# Patient Record
Sex: MALE | Race: BLACK OR AFRICAN AMERICAN | Employment: OTHER | URBAN - METROPOLITAN AREA
[De-identification: names, ages, dates, MRNs, and addresses within clinical notes are randomized per-mention and may not be internally consistent; named-entity substitution may affect disease eponyms.]

---

## 2017-08-17 ENCOUNTER — HOSPITAL ENCOUNTER (OUTPATIENT)
Dept: SURGERY | Age: 62
Discharge: HOME | End: 2017-08-17
Payer: COMMERCIAL

## 2017-08-17 VITALS
TEMPERATURE: 98.8 F | RESPIRATION RATE: 16 BRPM | WEIGHT: 173 LBS | DIASTOLIC BLOOD PRESSURE: 86 MMHG | OXYGEN SATURATION: 98 % | HEIGHT: 70 IN | BODY MASS INDEX: 24.77 KG/M2 | SYSTOLIC BLOOD PRESSURE: 148 MMHG | HEART RATE: 66 BPM

## 2017-08-17 LAB
ANION GAP SERPL CALC-SCNC: 7 MMOL/L (ref 7–16)
BUN SERPL-MCNC: 14 MG/DL (ref 8–23)
CALCIUM SERPL-MCNC: 9.1 MG/DL (ref 8.3–10.4)
CHLORIDE SERPL-SCNC: 107 MMOL/L (ref 98–107)
CO2 SERPL-SCNC: 28 MMOL/L (ref 21–32)
CREAT SERPL-MCNC: 1.46 MG/DL (ref 0.8–1.5)
GLUCOSE SERPL-MCNC: 106 MG/DL (ref 65–100)
HGB BLD-MCNC: 11.7 G/DL (ref 13.6–17.2)
POTASSIUM SERPL-SCNC: 4 MMOL/L (ref 3.5–5.1)
SODIUM SERPL-SCNC: 142 MMOL/L (ref 136–145)

## 2017-08-17 PROCEDURE — 80048 BASIC METABOLIC PNL TOTAL CA: CPT | Performed by: SURGERY

## 2017-08-17 PROCEDURE — 85018 HEMOGLOBIN: CPT | Performed by: SURGERY

## 2017-08-29 ENCOUNTER — ANESTHESIA EVENT (OUTPATIENT)
Dept: SURGERY | Age: 62
End: 2017-08-29
Payer: COMMERCIAL

## 2017-08-30 ENCOUNTER — APPOINTMENT (OUTPATIENT)
Dept: INTERVENTIONAL RADIOLOGY/VASCULAR | Age: 62
End: 2017-08-30
Attending: SURGERY
Payer: COMMERCIAL

## 2017-08-30 ENCOUNTER — ANESTHESIA (OUTPATIENT)
Dept: SURGERY | Age: 62
End: 2017-08-30
Payer: COMMERCIAL

## 2017-08-30 ENCOUNTER — HOSPITAL ENCOUNTER (OUTPATIENT)
Age: 62
Setting detail: OUTPATIENT SURGERY
Discharge: HOME | End: 2017-08-30
Attending: SURGERY | Admitting: SURGERY
Payer: COMMERCIAL

## 2017-08-30 VITALS
DIASTOLIC BLOOD PRESSURE: 76 MMHG | WEIGHT: 173.5 LBS | SYSTOLIC BLOOD PRESSURE: 103 MMHG | TEMPERATURE: 97.5 F | HEART RATE: 61 BPM | RESPIRATION RATE: 16 BRPM | BODY MASS INDEX: 24.84 KG/M2 | OXYGEN SATURATION: 93 % | HEIGHT: 70 IN

## 2017-08-30 LAB — GLUCOSE BLD STRIP.AUTO-MCNC: 116 MG/DL (ref 65–100)

## 2017-08-30 PROCEDURE — 74011250636 HC RX REV CODE- 250/636: Performed by: SURGERY

## 2017-08-30 PROCEDURE — 77030008477 HC STYL SATN SLP COVD -A: Performed by: ANESTHESIOLOGY

## 2017-08-30 PROCEDURE — 77030019908 HC STETH ESOPH SIMS -A: Performed by: ANESTHESIOLOGY

## 2017-08-30 PROCEDURE — 77030008703 HC TU ET UNCUF COVD -A: Performed by: ANESTHESIOLOGY

## 2017-08-30 PROCEDURE — 74011250636 HC RX REV CODE- 250/636: Performed by: ANESTHESIOLOGY

## 2017-08-30 PROCEDURE — 74011000250 HC RX REV CODE- 250: Performed by: ANESTHESIOLOGY

## 2017-08-30 PROCEDURE — C1894 INTRO/SHEATH, NON-LASER: HCPCS

## 2017-08-30 PROCEDURE — 77030020782 HC GWN BAIR PAWS FLX 3M -B: Performed by: ANESTHESIOLOGY

## 2017-08-30 PROCEDURE — C1725 CATH, TRANSLUMIN NON-LASER: HCPCS

## 2017-08-30 PROCEDURE — C1769 GUIDE WIRE: HCPCS

## 2017-08-30 PROCEDURE — 74011250636 HC RX REV CODE- 250/636

## 2017-08-30 PROCEDURE — 76210000008 HC OR PH I REC 6 TO 6.5 HR: Performed by: SURGERY

## 2017-08-30 PROCEDURE — C1887 CATHETER, GUIDING: HCPCS

## 2017-08-30 PROCEDURE — 74011636320 HC RX REV CODE- 636/320: Performed by: SURGERY

## 2017-08-30 PROCEDURE — 82962 GLUCOSE BLOOD TEST: CPT

## 2017-08-30 PROCEDURE — 76060000034 HC ANESTHESIA 1.5 TO 2 HR: Performed by: SURGERY

## 2017-08-30 PROCEDURE — 74011000250 HC RX REV CODE- 250

## 2017-08-30 PROCEDURE — 76010000129 HC CV SURG 1.5 TO 2 HR: Performed by: SURGERY

## 2017-08-30 PROCEDURE — 74011250637 HC RX REV CODE- 250/637: Performed by: ANESTHESIOLOGY

## 2017-08-30 PROCEDURE — 77030021532 HC CATH ANGI DX IMPRS MRTM -B

## 2017-08-30 PROCEDURE — 76210000020 HC REC RM PH II FIRST 0.5 HR: Performed by: SURGERY

## 2017-08-30 PROCEDURE — 75710 ARTERY X-RAYS ARM/LEG: CPT

## 2017-08-30 RX ORDER — SODIUM CHLORIDE 0.9 % (FLUSH) 0.9 %
5-10 SYRINGE (ML) INJECTION AS NEEDED
Status: DISCONTINUED | OUTPATIENT
Start: 2017-08-30 | End: 2017-08-30 | Stop reason: HOSPADM

## 2017-08-30 RX ORDER — ACETAMINOPHEN 500 MG
1000 TABLET ORAL
Status: DISCONTINUED | OUTPATIENT
Start: 2017-08-30 | End: 2017-08-30 | Stop reason: HOSPADM

## 2017-08-30 RX ORDER — DIPHENHYDRAMINE HYDROCHLORIDE 50 MG/ML
12.5 INJECTION, SOLUTION INTRAMUSCULAR; INTRAVENOUS
Status: DISCONTINUED | OUTPATIENT
Start: 2017-08-30 | End: 2017-08-30 | Stop reason: HOSPADM

## 2017-08-30 RX ORDER — LIDOCAINE HYDROCHLORIDE 20 MG/ML
INJECTION, SOLUTION EPIDURAL; INFILTRATION; INTRACAUDAL; PERINEURAL AS NEEDED
Status: DISCONTINUED | OUTPATIENT
Start: 2017-08-30 | End: 2017-08-30 | Stop reason: HOSPADM

## 2017-08-30 RX ORDER — PROTAMINE SULFATE 10 MG/ML
INJECTION, SOLUTION INTRAVENOUS AS NEEDED
Status: DISCONTINUED | OUTPATIENT
Start: 2017-08-30 | End: 2017-08-30 | Stop reason: HOSPADM

## 2017-08-30 RX ORDER — SODIUM CHLORIDE, SODIUM LACTATE, POTASSIUM CHLORIDE, CALCIUM CHLORIDE 600; 310; 30; 20 MG/100ML; MG/100ML; MG/100ML; MG/100ML
150 INJECTION, SOLUTION INTRAVENOUS CONTINUOUS
Status: DISCONTINUED | OUTPATIENT
Start: 2017-08-30 | End: 2017-08-30 | Stop reason: HOSPADM

## 2017-08-30 RX ORDER — NEOSTIGMINE METHYLSULFATE 1 MG/ML
INJECTION INTRAVENOUS AS NEEDED
Status: DISCONTINUED | OUTPATIENT
Start: 2017-08-30 | End: 2017-08-30 | Stop reason: HOSPADM

## 2017-08-30 RX ORDER — LIDOCAINE HYDROCHLORIDE 10 MG/ML
0.1 INJECTION INFILTRATION; PERINEURAL AS NEEDED
Status: DISCONTINUED | OUTPATIENT
Start: 2017-08-30 | End: 2017-08-30 | Stop reason: HOSPADM

## 2017-08-30 RX ORDER — GLYCOPYRROLATE 0.2 MG/ML
0.2 INJECTION INTRAMUSCULAR; INTRAVENOUS ONCE
Status: COMPLETED | OUTPATIENT
Start: 2017-08-30 | End: 2017-08-30

## 2017-08-30 RX ORDER — MIDAZOLAM HYDROCHLORIDE 1 MG/ML
2 INJECTION, SOLUTION INTRAMUSCULAR; INTRAVENOUS
Status: DISCONTINUED | OUTPATIENT
Start: 2017-08-30 | End: 2017-08-30 | Stop reason: HOSPADM

## 2017-08-30 RX ORDER — HYDROCODONE BITARTRATE AND ACETAMINOPHEN 5; 325 MG/1; MG/1
1 TABLET ORAL AS NEEDED
Status: DISCONTINUED | OUTPATIENT
Start: 2017-08-30 | End: 2017-08-30 | Stop reason: HOSPADM

## 2017-08-30 RX ORDER — OXYCODONE AND ACETAMINOPHEN 5; 325 MG/1; MG/1
1 TABLET ORAL
Status: DISCONTINUED | OUTPATIENT
Start: 2017-08-30 | End: 2017-08-30 | Stop reason: HOSPADM

## 2017-08-30 RX ORDER — FAMOTIDINE 20 MG/1
20 TABLET, FILM COATED ORAL ONCE
Status: COMPLETED | OUTPATIENT
Start: 2017-08-30 | End: 2017-08-30

## 2017-08-30 RX ORDER — HEPARIN SODIUM 5000 [USP'U]/ML
INJECTION, SOLUTION INTRAVENOUS; SUBCUTANEOUS AS NEEDED
Status: DISCONTINUED | OUTPATIENT
Start: 2017-08-30 | End: 2017-08-30 | Stop reason: HOSPADM

## 2017-08-30 RX ORDER — PROPOFOL 10 MG/ML
INJECTION, EMULSION INTRAVENOUS AS NEEDED
Status: DISCONTINUED | OUTPATIENT
Start: 2017-08-30 | End: 2017-08-30 | Stop reason: HOSPADM

## 2017-08-30 RX ORDER — SODIUM CHLORIDE 0.9 % (FLUSH) 0.9 %
5-10 SYRINGE (ML) INJECTION EVERY 8 HOURS
Status: DISCONTINUED | OUTPATIENT
Start: 2017-08-30 | End: 2017-08-30 | Stop reason: HOSPADM

## 2017-08-30 RX ORDER — LIDOCAINE HYDROCHLORIDE 20 MG/ML
INJECTION, SOLUTION INFILTRATION; PERINEURAL AS NEEDED
Status: DISCONTINUED | OUTPATIENT
Start: 2017-08-30 | End: 2017-08-30 | Stop reason: HOSPADM

## 2017-08-30 RX ORDER — HYDROMORPHONE HYDROCHLORIDE 2 MG/ML
0.5 INJECTION, SOLUTION INTRAMUSCULAR; INTRAVENOUS; SUBCUTANEOUS
Status: DISCONTINUED | OUTPATIENT
Start: 2017-08-30 | End: 2017-08-30 | Stop reason: HOSPADM

## 2017-08-30 RX ORDER — ONDANSETRON 2 MG/ML
4 INJECTION INTRAMUSCULAR; INTRAVENOUS
Status: DISCONTINUED | OUTPATIENT
Start: 2017-08-30 | End: 2017-08-30 | Stop reason: HOSPADM

## 2017-08-30 RX ORDER — SODIUM CHLORIDE 9 MG/ML
50 INJECTION, SOLUTION INTRAVENOUS CONTINUOUS
Status: DISCONTINUED | OUTPATIENT
Start: 2017-08-30 | End: 2017-08-30 | Stop reason: HOSPADM

## 2017-08-30 RX ORDER — CEFAZOLIN SODIUM IN 0.9 % NACL 2 G/50 ML
2 INTRAVENOUS SOLUTION, PIGGYBACK (ML) INTRAVENOUS ONCE
Status: COMPLETED | OUTPATIENT
Start: 2017-08-30 | End: 2017-08-30

## 2017-08-30 RX ORDER — HEPARIN SODIUM 1000 [USP'U]/ML
INJECTION, SOLUTION INTRAVENOUS; SUBCUTANEOUS AS NEEDED
Status: DISCONTINUED | OUTPATIENT
Start: 2017-08-30 | End: 2017-08-30 | Stop reason: HOSPADM

## 2017-08-30 RX ORDER — ROCURONIUM BROMIDE 10 MG/ML
INJECTION, SOLUTION INTRAVENOUS AS NEEDED
Status: DISCONTINUED | OUTPATIENT
Start: 2017-08-30 | End: 2017-08-30 | Stop reason: HOSPADM

## 2017-08-30 RX ORDER — FENTANYL CITRATE 50 UG/ML
100 INJECTION, SOLUTION INTRAMUSCULAR; INTRAVENOUS ONCE
Status: DISCONTINUED | OUTPATIENT
Start: 2017-08-30 | End: 2017-08-30 | Stop reason: HOSPADM

## 2017-08-30 RX ORDER — GLYCOPYRROLATE 0.2 MG/ML
INJECTION INTRAMUSCULAR; INTRAVENOUS AS NEEDED
Status: DISCONTINUED | OUTPATIENT
Start: 2017-08-30 | End: 2017-08-30 | Stop reason: HOSPADM

## 2017-08-30 RX ORDER — MORPHINE SULFATE 2 MG/ML
1 INJECTION, SOLUTION INTRAMUSCULAR; INTRAVENOUS
Status: DISCONTINUED | OUTPATIENT
Start: 2017-08-30 | End: 2017-08-30 | Stop reason: HOSPADM

## 2017-08-30 RX ORDER — FENTANYL CITRATE 50 UG/ML
INJECTION, SOLUTION INTRAMUSCULAR; INTRAVENOUS AS NEEDED
Status: DISCONTINUED | OUTPATIENT
Start: 2017-08-30 | End: 2017-08-30 | Stop reason: HOSPADM

## 2017-08-30 RX ORDER — SODIUM CHLORIDE 9 MG/ML
75 INJECTION, SOLUTION INTRAVENOUS CONTINUOUS
Status: DISCONTINUED | OUTPATIENT
Start: 2017-08-30 | End: 2017-08-30 | Stop reason: HOSPADM

## 2017-08-30 RX ORDER — ONDANSETRON 2 MG/ML
INJECTION INTRAMUSCULAR; INTRAVENOUS AS NEEDED
Status: DISCONTINUED | OUTPATIENT
Start: 2017-08-30 | End: 2017-08-30 | Stop reason: HOSPADM

## 2017-08-30 RX ADMIN — LIDOCAINE HYDROCHLORIDE 100 MG: 20 INJECTION, SOLUTION EPIDURAL; INFILTRATION; INTRACAUDAL; PERINEURAL at 09:02

## 2017-08-30 RX ADMIN — FAMOTIDINE 20 MG: 20 TABLET ORAL at 07:44

## 2017-08-30 RX ADMIN — GLYCOPYRROLATE 0.4 MG: 0.2 INJECTION INTRAMUSCULAR; INTRAVENOUS at 10:01

## 2017-08-30 RX ADMIN — PROPOFOL 200 MG: 10 INJECTION, EMULSION INTRAVENOUS at 09:02

## 2017-08-30 RX ADMIN — CEFAZOLIN 2 G: 1 INJECTION, POWDER, FOR SOLUTION INTRAMUSCULAR; INTRAVENOUS; PARENTERAL at 08:53

## 2017-08-30 RX ADMIN — NEOSTIGMINE METHYLSULFATE 3 MG: 1 INJECTION INTRAVENOUS at 10:01

## 2017-08-30 RX ADMIN — HEPARIN SODIUM 3000 UNITS: 1000 INJECTION, SOLUTION INTRAVENOUS; SUBCUTANEOUS at 09:25

## 2017-08-30 RX ADMIN — SODIUM CHLORIDE, SODIUM LACTATE, POTASSIUM CHLORIDE, AND CALCIUM CHLORIDE 150 ML/HR: 600; 310; 30; 20 INJECTION, SOLUTION INTRAVENOUS at 07:43

## 2017-08-30 RX ADMIN — MIDAZOLAM HYDROCHLORIDE 2 MG: 1 INJECTION, SOLUTION INTRAMUSCULAR; INTRAVENOUS at 08:34

## 2017-08-30 RX ADMIN — FENTANYL CITRATE 100 MCG: 50 INJECTION, SOLUTION INTRAMUSCULAR; INTRAVENOUS at 09:02

## 2017-08-30 RX ADMIN — SODIUM CHLORIDE, SODIUM LACTATE, POTASSIUM CHLORIDE, AND CALCIUM CHLORIDE: 600; 310; 30; 20 INJECTION, SOLUTION INTRAVENOUS at 10:15

## 2017-08-30 RX ADMIN — ROCURONIUM BROMIDE 35 MG: 10 INJECTION, SOLUTION INTRAVENOUS at 09:02

## 2017-08-30 RX ADMIN — ONDANSETRON 4 MG: 2 INJECTION INTRAMUSCULAR; INTRAVENOUS at 09:24

## 2017-08-30 RX ADMIN — PROTAMINE SULFATE 20 MG: 10 INJECTION, SOLUTION INTRAVENOUS at 09:59

## 2017-08-30 RX ADMIN — GLYCOPYRROLATE 0.2 MG: 0.2 INJECTION, SOLUTION INTRAMUSCULAR; INTRAVENOUS at 10:52

## 2017-08-30 NOTE — BRIEF OP NOTE
BRIEF OPERATIVE NOTE    Date of Procedure: 8/30/2017   Preoperative Diagnosis: Claudication [facility]) [I73.9]  Femoral-femoral bypass graft thrombosis, right, initial encounter ([facility] [address] [R64.974I]  Postoperative Diagnosis: [facility] [I73.9]  Femoral-femoral bypass graft     Procedure(s):  RIGHT LOWER EXTREMITY ARTERIOGRAM, ABDOMINAL AORTAGRAM,RIGHT PROFUNDA PTA, ULTRASOUND GUIDED VACULAR ACCESS.   Surgeon(s) and Role:     * Anthony Carson MD - Primary         Assistant Staff:       Surgical Staff:  Circ-1: Lin Sidhu RN  Circ-Relief: Lisbeth Morrison RN; Arcadio Silverio RN  Radiology Technician: Chantell Jones, RT, R, CT; Angela Henao, RT, R, CT  Event Time In   Incision Start 4105   Incision Close      Anesthesia: General   Estimated Blood Loss: 25cc  Specimens: * No specimens in log *   Findings: Chronic Right SFA Occlusion, PFA stenosis   Complications: None  Implants: * No implants in log *

## 2017-08-30 NOTE — ANESTHESIA POSTPROCEDURE EVALUATION
Post-Anesthesia Evaluation and Assessment    Patient: Sonu Bardales MRN: [id number]  SSN: [id number]    YOB: 1955  Age: 57 y.o. Sex: male       Cardiovascular Function/Vital Signs  Visit Vitals    /72    Pulse (!) 51    Temp 36.4 °C (97.5 °F)    Resp 16    Ht 5' 10\" (1.778 m)    Wt 78.7 kg (173 lb 8 oz)    SpO2 98%    BMI 24.89 kg/m2       Patient is status post general anesthesia for Procedure(s):  RIGHT LOWER EXTREMITY ARTERIOGRAM, ABDOMINAL AORTAGRAM,RIGHT PROFUNDA PTA, ULTRASOUND GUIDED VACULAR ACCESS. .    Nausea/Vomiting: None    Postoperative hydration reviewed and adequate. Pain:  Pain Scale 1: Numeric (0 - 10) (08/30/17 1130)  Pain Intensity 1: 0 (08/30/17 1130)   Managed    Neurological Status:   Neuro (WDL): Exceptions to [facility] (08/30/17 1130)  Neuro  Neurologic State: Drowsy (08/30/17 1130)  Orientation Level: Oriented X4 (08/30/17 1130)  Cognition: Appropriate decision making; Follows commands (08/30/17 1130)  Speech: Clear (08/30/17 1130)  LUE Motor Response: Purposeful (08/30/17 1130)  LLE Motor Response: Purposeful (08/30/17 1130)  RUE Motor Response: Purposeful (08/30/17 1130)  RLE Motor Response: Purposeful (08/30/17 1130)   At baseline    Mental Status and Level of Consciousness: Arousable    Pulmonary Status:   O2 Device: Nasal cannula (08/30/17 1354)   Adequate oxygenation and airway patent    Complications related to anesthesia: None    Post-anesthesia assessment completed.  No concerns    Signed By: Kelvin Valdes MD     August 30, 2017

## 2017-08-30 NOTE — PROCEDURES
[address]   PROCEDURE NOTE       Name:  Nelda Moeller   MR#:  [id number]   :  1955   Account #:  [de-identified]   Date of Adm:  2017       DATE OF PROCEDURE: 2017     PREOPERATIVE DIAGNOSIS: Right lower extremity ischemia with   claudication. POSTOPERATIVE DIAGNOSIS: Right lower extremity ischemia with   claudication. PROCEDURES: Aortogram with right lower extremity arteriogram.    SURGEON: Rajendra Maciel MD    ANESTHESIA: IV sedation plus 1% lidocaine as local.      TOTAL CONTRAST: 86 mL of Isovue 300. TOTAL FLUOROSCOPY TIME: 10 minutes. INDICATIONS: A 70-year-old gentleman who has previously   undergone right lower extremity revascularization x2, most   recently with a PTFE bypass graft that has failed. He has   recurrent symptoms of severe short distance claudication, right   leg. He is scheduled for arteriogram and possible catheter based   intervention. DESCRIPTION OF PROCEDURE: The patient was brought to the angio   suite, placed on the angio table in supine position. Following   adequate IV sedation and a time-out procedure, the groins were   draped and prepped in sterile fashion. Xylocaine 1% was then   used to anesthetize the skin and subcutaneous tissue overlying   the left common femoral artery. Left common femoral artery was   then percutaneously punctured under direct vision using   ultrasound. A 5-Trinidadian sheath was placed over a guidewire. Next,   a 5-Trinidadian Omniflush catheter was advanced over a guidewire into   the supraceliac aorta. Abdominal aortogram was performed. The   catheter was then pulled down to just above the aortic   bifurcation and used to direct the guidewire over to the right   side. The catheter was then advanced to the level of the   external iliac artery on this side. Bolus tameka imaging of the   right lower extremity was then performed to the level of the   ankle. The patient was then systemically heparinized.  A 6-Trinidadian crossover sheath was advanced to the external iliac artery on   the right. The area of stenosis seen in the profunda femoris   artery was dilated with an 8 mm balloon. Completion imaging   demonstrated excellent result with minimal residual stenosis. At this point, the guidewires and catheters were removed and   direct pressure was held until hemostasis was achieved. The   patient was then transferred to the recovery room in stable   condition. ANGIOGRAPHIC FINDINGS: Abdominal aorta is of normal caliber. There appeared to be single renal arteries bilaterally. There is   diffuse irregularity of the distal abdominal aorta. The iliac   arteries were patent bilaterally. I believe the hypogastric   arteries are patent bilateral, although the left hypogastric   artery is difficult to see. The common femoral artery is patent. The SFA is occluded. The previously placed bypass graft is   occluded. There appears to be an area of high-grade stenosis at   the origin of the profunda femoris artery on this side. Beyond   this, there are robust collaterals that ultimately refill the   popliteal artery at the level of the knee. The anterior tibial   and posterior tibial arteries appeared to be occluded. There is   reconstitution of posterior tibial artery just above the level   of the ankle, which provides dominant flow onto the foot. IMPRESSION: Satisfactory percutaneous transluminal angioplasty   of proximal right profunda femoris artery stenosis.         MD Nishant Crum / Juan R Tavares   D:  08/30/2017   16:15   T:  08/30/2017   17:40   Job #:  [id number]

## 2017-08-30 NOTE — IP AVS SNAPSHOT
[address] [id number] 
[phone] Patient: Cathrine Galeazzi MRN: [id number] DCP:8/30/7330 You are allergic to the following Allergen Reactions Shellfish Derived Anaphylaxis And any fresh fruit (processed fruit is tolerable) Recent Documentation Height Weight BMI Smoking Status 1.778 m 78.7 kg 24.89 kg/m2 Former Smoker Emergency Contacts Name Discharge Info Relation Home Work Mobile Judith Guzman DISCHARGE CAREGIVER [3] Spouse [3] [phone] About your hospitalization You were admitted on:  August 30, 2017 You last received care in the:  [facility] PACU You were discharged on:  August 30, 2017 Unit phone number:  [phone] Why you were hospitalized Your primary diagnosis was:  Not on File Providers Seen During Your Hospitalizations Provider Role Specialty Primary office phone Gabby Albright MD Attending Provider Vascular Surgery [phone] Your Primary Care Physician (PCP) Primary Care Physician Office Phone Office Fax Dossie Case [phone] Follow-up Information Follow up With Details Comments Contact Info Basilio Cast NP   [address] [facility] 
[phone] Gabby Albright MD Follow up on 9/14/2017 1:15 pm [facility] [address] [facility] [address] 
[phone] Your Appointments Thursday September 14, 2017  1:15 PM EDT  
ESTABLISHED PATIENT with Gabby Albright MD  
[facility] [address] [address] [phone] Current Discharge Medication List  
  
CONTINUE these medications which have NOT CHANGED Dose & Instructions Dispensing Information Comments Morning Noon Evening Bedtime amLODIPine 10 mg tablet Commonly known as:  Leilani Chimes Your last dose was: Your next dose is:    
   
   
 Dose:  10 mg Take 10 mg by mouth every morning. Refills:  0  
     
   
   
   
  
 atenolol 50 mg tablet Commonly known as:  TENORMIN Your last dose was: Your next dose is:    
   
   
 Dose:  50 mg Take 50 mg by mouth every morning. Refills:  0  
     
   
   
   
  
 atorvastatin 40 mg tablet Commonly known as:  LIPITOR Your last dose was: Your next dose is:    
   
   
 Dose:  40 mg Take 40 mg by mouth nightly. Refills:  0  
     
   
   
   
  
 CYMBALTA PO Your last dose was: Your next dose is: Take  by mouth every morning. Refills:  0  
     
   
   
   
  
 lisinopril 10 mg tablet Commonly known as:  Kamlesh Human Your last dose was: Your next dose is:    
   
   
 Dose:  10 mg Take 10 mg by mouth every morning. Refills:  0 PLAVIX 75 mg Tab Generic drug:  clopidogrel Your last dose was: Your next dose is:    
   
   
 Dose:  75 mg Take 75 mg by mouth every morning. Last dose on 11.7.16 Refills:  0  
     
   
   
   
  
 tamsulosin 0.4 mg capsule Commonly known as:  FLOMAX Your last dose was: Your next dose is:    
   
   
 Dose:  0.4 mg Take 0.4 mg by mouth nightly. Refills:  0 Discharge Instructions Arteriogram: What to Expect at [facility] Your Recovery The doctor inserted a thin, flexible tube (catheter) into a blood vessel in your groin. In some cases, the catheter is placed in a blood vessel in the arm. After an arteriogram, your groin or arm may have a bruise and feel sore for a day or two. You can do light activities around the house but nothing strenuous for several days.  
Your doctor may give you specific instructions on when you can do your normal activities again, such as driving and going back to work. This care sheet gives you a general idea about how long it will take for you to recover. But each person recovers at a different pace. Follow the steps below to feel better as quickly as possible. How can you care for yourself at home? Activity · Do not do strenuous exercise and do not lift, pull, or push anything heavy until your doctor says it is okay. This may be for a day or two. You can walk around the house and do light activity, such as cooking. · You may shower 24 to 48 hours after the procedure, if your doctor okays it. Pat the incision dry. Do not take a bath for 1 week, or until your doctor tells you it is okay. · If the catheter was placed in your groin, try not to walk up stairs for the first couple of days. · If your doctor recommends it, get more exercise. Walking is a good choice. Bit by bit, increase the amount you walk every day. Try for at least 30 minutes on most days of the week. Diet · Drink plenty of fluids to help your body flush out the dye. If you have kidney, heart, or liver disease and have to limit fluids, talk with your doctor before you increase the amount of fluids you drink. · You can eat your normal diet. If your stomach is upset, try bland, low-fat foods like plain rice, broiled chicken, toast, and yogurt. Medicines · Be safe with medicines. Read and follow all instructions on the label. ¨ If the doctor gave you a prescription medicine for pain, take it as prescribed. ¨ If you are not taking a prescription pain medicine, ask your doctor if you can take an over-the-counter medicine. · If you take blood thinners, such as warfarin (Coumadin), clopidogrel (Plavix), or aspirin, be sure to talk to your doctor. He or she will tell you if and when to start taking those medicines again. Make sure that you understand exactly what your doctor wants you to do. · Your doctor will tell you if and when you can restart your medicines. He or she will also give you instructions about taking any new medicines. Care of the catheter site · You will have a dressing over the cut (incision). A dressing helps the incision heal and protects it. Your doctor will tell you how to take care of this. · Put ice or a cold pack on the area for 10 to 20 minutes at a time to help with soreness or swelling. Put a thin cloth between the ice and your skin. Follow-up care is a key part of your treatment and safety. Be sure to make and go to all appointments, and call your doctor if you are having problems. It's also a good idea to know your test results and keep a list of the medicines you take. When should you call for help? Call 911 anytime you think you may need emergency care. For example, call if: 
· You passed out (lost consciousness). · You have severe trouble breathing. · You have sudden chest pain and shortness of breath, or you cough up blood. Call your doctor now or seek immediate medical care if: 
· You are bleeding from the area where the catheter was put in your artery. · You have a fast-growing, painful lump at the catheter site. · You have signs of infection, such as: 
¨ Increased pain, swelling, warmth, or redness. ¨ Red streaks leading from the incision. ¨ Pus draining from the incision. ¨ A fever. Watch closely for any changes in your health, and be sure to contact your doctor if: 
· You don't get better as expected. After general anesthesia or intravenous sedation, for 24 hours or while taking prescription Narcotics: · Limit your activities · Do not drive and operate hazardous machinery · Do not make important personal or business decisions · Do  not drink alcoholic beverages · If you have not urinated within 8 hours after discharge, please contact your surgeon on call. *  Please give a list of your current medications to your Primary Care Provider. *  Please update this list whenever your medications are discontinued, doses are 
    changed, or new medications (including over-the-counter products) are added. *  Please carry medication information at all times in case of emergency situations. These are general instructions for a healthy lifestyle: No smoking/ No tobacco products/ Avoid exposure to second hand smoke Surgeon General's Warning:  Quitting smoking now greatly reduces serious risk to your health. Obesity, smoking, and sedentary lifestyle greatly increases your risk for illness A healthy diet, regular physical exercise & weight monitoring are important for maintaining a healthy lifestyle You may be retaining fluid if you have a history of heart failure or if you experience any of the following symptoms:  Weight gain of 3 pounds or more overnight or 5 pounds in a week, increased swelling in our hands or feet or shortness of breath while lying flat in bed. Please call your doctor as soon as you notice any of these symptoms; do not wait until your next office visit. Recognize signs and symptoms of STROKE: 
 
F-face looks uneven A-arms unable to move or move unevenly S-speech slurred or non-existent T-time-call 911 as soon as signs and symptoms begin-DO NOT go Back to bed or wait to see if you get better-TIME IS BRAIN. Discharge Orders None Introducing [facility]! Garima Bonilla introduces AirPOS patient portal. Now you can access parts of your medical record, email your doctor's office, and request medication refills online. 1. In your internet browser, go to [email or web address] 2. Click on the First Time User? Click Here link in the Sign In box. You will see the New Member Sign Up page. 3. Enter your AirPOS Access Code exactly as it appears below. You will not need to use this code after youve completed the sign-up process.  If you do not sign up before the expiration date, you must request a new code. · EUROBOX Access Code: [email or web address] Expires: 11/6/2017  3:32 PM 
 
4. Enter the last four digits of your Social Security Number (xxxx) and Date of Birth (mm/dd/yyyy) as indicated and click Submit. You will be taken to the next sign-up page. 5. Create a EUROBOX ID. This will be your EUROBOX login ID and cannot be changed, so think of one that is secure and easy to remember. 6. Create a EUROBOX password. You can change your password at any time. 7. Enter your Password Reset Question and Answer. This can be used at a later time if you forget your password. 8. Enter your e-mail address. You will receive e-mail notification when new information is available in [address] 9. Click Sign Up. You can now view and download portions of your medical record. 10. Click the Download Summary menu link to download a portable copy of your medical information. If you have questions, please visit the Frequently Asked Questions section of the EUROBOX website. Remember, EUROBOX is NOT to be used for urgent needs. For medical emergencies, dial 911. Now available from your iPhone and Android! General Information Please provide this summary of care documentation to your next provider. Patient Signature:  ____________________________________________________________ Date:  ____________________________________________________________  
  
Wero Rousseau Provider Signature:  ____________________________________________________________ Date:  ____________________________________________________________

## 2017-08-30 NOTE — DISCHARGE INSTRUCTIONS
Arteriogram: What to Expect at [address] [place] [address] Drive inserted a thin, flexible tube (catheter) into a blood vessel in your groin. In some cases, the catheter is placed in a blood vessel in the arm. After an arteriogram, your groin or arm may have a bruise and feel sore for a day or two. You can do light activities around the house but nothing strenuous for several days. Your doctor may give you specific instructions on when you can do your normal activities again, such as driving and going back to work. This care sheet gives you a general idea about how long it will take for you to recover. But each person recovers at a different pace. Follow the steps below to feel better as quickly as possible. How can you care for yourself at home? Activity  · Do not do strenuous exercise and do not lift, pull, or push anything heavy until your doctor says it is okay. This may be for a day or two. You can walk around the house and do light activity, such as cooking. · You may shower 24 to 48 hours after the procedure, if your doctor okays it. Pat the incision dry. Do not take a bath for 1 week, or until your doctor tells you it is okay. · If the catheter was placed in your groin, try not to walk up stairs for the first couple of days. · If your doctor recommends it, get more exercise. Walking is a good choice. Bit by bit, increase the amount you walk every day. Try for at least 30 minutes on most days of the week. Diet  · Drink plenty of fluids to help your body flush out the dye. If you have kidney, heart, or liver disease and have to limit fluids, talk with your doctor before you increase the amount of fluids you drink. · You can eat your normal diet. If your stomach is upset, try bland, low-fat foods like plain rice, broiled chicken, toast, and yogurt. Medicines  · Be safe with medicines. Read and follow all instructions on the label.   ¨ If the doctor gave you a prescription medicine for pain, take it as prescribed. ¨ If you are not taking a prescription pain medicine, ask your doctor if you can take an over-the-counter medicine. · If you take blood thinners, such as warfarin (Coumadin), clopidogrel (Plavix), or aspirin, be sure to talk to your doctor. He or she will tell you if and when to start taking those medicines again. Make sure that you understand exactly what your doctor wants you to do. · Your doctor will tell you if and when you can restart your medicines. He or she will also give you instructions about taking any new medicines. Care of the catheter site  · You will have a dressing over the cut (incision). A dressing helps the incision heal and protects it. Your doctor will tell you how to take care of this. · Put ice or a cold pack on the area for 10 to 20 minutes at a time to help with soreness or swelling. Put a thin cloth between the ice and your skin. Follow-up care is a key part of your treatment and safety. Be sure to make and go to all appointments, and call your doctor if you are having problems. It's also a good idea to know your test results and keep a list of the medicines you take. When should you call for help? Call 911 anytime you think you may need emergency care. For example, call if:  · You passed out (lost consciousness). · You have severe trouble breathing. · You have sudden chest pain and shortness of breath, or you cough up blood. Call your doctor now or seek immediate medical care if:  · You are bleeding from the area where the catheter was put in your artery. · You have a fast-growing, painful lump at the catheter site. · You have signs of infection, such as:  ¨ Increased pain, swelling, warmth, or redness. ¨ Red streaks leading from the incision. ¨ Pus draining from the incision. ¨ A fever. Watch closely for any changes in your health, and be sure to contact your doctor if:  · You don't get better as expected.   After general anesthesia or intravenous sedation, for 24 hours or while taking prescription Narcotics:  · Limit your activities  · Do not drive and operate hazardous machinery  · Do not make important personal or business decisions  · Do  not drink alcoholic beverages  · If you have not urinated within 8 hours after discharge, please contact your surgeon on call. *  Please give a list of your current medications to your Primary Care Provider. *  Please update this list whenever your medications are discontinued, doses are      changed, or new medications (including over-the-counter products) are added. *  Please carry medication information at all times in case of emergency situations. These are general instructions for a healthy lifestyle:  No smoking/ No tobacco products/ Avoid exposure to second hand smoke  Surgeon General's Warning:  Quitting smoking now greatly reduces serious risk to your health. Obesity, smoking, and sedentary lifestyle greatly increases your risk for illness  A healthy diet, regular physical exercise & weight monitoring are important for maintaining a healthy lifestyle    You may be retaining fluid if you have a history of heart failure or if you experience any of the following symptoms:  Weight gain of 3 pounds or more overnight or 5 pounds in a week, increased swelling in our hands or feet or shortness of breath while lying flat in bed. Please call your doctor as soon as you notice any of these symptoms; do not wait until your next office visit. Recognize signs and symptoms of STROKE:    F-face looks uneven    A-arms unable to move or move unevenly    S-speech slurred or non-existent    T-time-call 911 as soon as signs and symptoms begin-DO NOT go       Back to bed or wait to see if you get better-TIME IS BRAIN.

## 2017-08-30 NOTE — ANESTHESIA PREPROCEDURE EVALUATION
Anesthetic History   No history of anesthetic complications            Review of Systems / Medical History  Patient summary reviewed and pertinent labs reviewed    Pulmonary          Smoker (former 45 pack years)         Neuro/Psych       CVA: no residual symptoms       Cardiovascular    Hypertension                   GI/Hepatic/Renal  Within defined limits              Endo/Other    Diabetes: well controlled, type 2         Other Findings   Comments: CVA 2021 taking plavix         Physical Exam    Airway  Mallampati: I  TM Distance: > 6 cm  Neck ROM: normal range of motion   Mouth opening: Normal     Cardiovascular    Rhythm: regular  Rate: normal         Dental    Dentition: Full lower dentures and Full upper dentures     Pulmonary        Wheezes:bibasilar         Abdominal         Other Findings            Anesthetic Plan    ASA: 3  Anesthesia type: general          Induction: Intravenous  Anesthetic plan and risks discussed with: Patient and Spouse

## 2017-08-30 NOTE — PERIOP NOTES
[address] Josiane Fairchild made aware that HR is 40 with BP in upper 80-90's. MD at bedside shortly after to give verbal orders for 0.2 mg robinul IV now. 1356 Jose Landeros called and notified that no pedal pulse via doppler. No orders received regarding this. Also MD notified that there are no orders. Per him, pt to be flat bed rest for 6 hrs and then discharge.     1312- Per Dr. Carol Ann Mccoy, pt may start Plavix as normal.

## 2018-04-20 ENCOUNTER — APPOINTMENT (OUTPATIENT)
Dept: INTERVENTIONAL RADIOLOGY/VASCULAR | Age: 63
End: 2018-04-20
Attending: SURGERY
Payer: COMMERCIAL

## 2018-04-20 ENCOUNTER — ANESTHESIA EVENT (OUTPATIENT)
Dept: SURGERY | Age: 63
End: 2018-04-20
Payer: COMMERCIAL

## 2018-04-20 ENCOUNTER — ANESTHESIA (OUTPATIENT)
Dept: SURGERY | Age: 63
End: 2018-04-20
Payer: COMMERCIAL

## 2018-04-20 ENCOUNTER — HOSPITAL ENCOUNTER (OUTPATIENT)
Age: 63
Setting detail: OUTPATIENT SURGERY
Discharge: HOME | End: 2018-04-20
Attending: SURGERY | Admitting: SURGERY
Payer: COMMERCIAL

## 2018-04-20 VITALS
HEART RATE: 64 BPM | SYSTOLIC BLOOD PRESSURE: 155 MMHG | DIASTOLIC BLOOD PRESSURE: 97 MMHG | HEIGHT: 70 IN | WEIGHT: 180.44 LBS | BODY MASS INDEX: 25.83 KG/M2 | TEMPERATURE: 97.1 F | RESPIRATION RATE: 16 BRPM | OXYGEN SATURATION: 94 %

## 2018-04-20 LAB
ALBUMIN SERPL-MCNC: 3.9 G/DL (ref 3.2–4.6)
ALBUMIN/GLOB SERPL: 1.1 {RATIO} (ref 1.2–3.5)
ALP SERPL-CCNC: 74 U/L (ref 50–136)
ALT SERPL-CCNC: 35 U/L (ref 12–65)
ANION GAP SERPL CALC-SCNC: 8 MMOL/L (ref 7–16)
AST SERPL-CCNC: 27 U/L (ref 15–37)
BILIRUB SERPL-MCNC: 0.5 MG/DL (ref 0.2–1.1)
BUN SERPL-MCNC: 16 MG/DL (ref 8–23)
CALCIUM SERPL-MCNC: 8.7 MG/DL (ref 8.3–10.4)
CHLORIDE SERPL-SCNC: 107 MMOL/L (ref 98–107)
CO2 SERPL-SCNC: 26 MMOL/L (ref 21–32)
CREAT SERPL-MCNC: 1.52 MG/DL (ref 0.8–1.5)
GLOBULIN SER CALC-MCNC: 3.7 G/DL (ref 2.3–3.5)
GLUCOSE SERPL-MCNC: 124 MG/DL (ref 65–100)
HGB BLD-MCNC: 12.3 G/DL (ref 13.6–17.2)
POTASSIUM SERPL-SCNC: 4 MMOL/L (ref 3.5–5.1)
PROT SERPL-MCNC: 7.6 G/DL (ref 6.3–8.2)
SODIUM SERPL-SCNC: 141 MMOL/L (ref 136–145)

## 2018-04-20 PROCEDURE — 74011250636 HC RX REV CODE- 250/636: Performed by: ANESTHESIOLOGY

## 2018-04-20 PROCEDURE — C1894 INTRO/SHEATH, NON-LASER: HCPCS

## 2018-04-20 PROCEDURE — C1887 CATHETER, GUIDING: HCPCS

## 2018-04-20 PROCEDURE — C1760 CLOSURE DEV, VASC: HCPCS

## 2018-04-20 PROCEDURE — C1769 GUIDE WIRE: HCPCS

## 2018-04-20 PROCEDURE — 77030011640 HC PAD GRND REM COVD -A: Performed by: SURGERY

## 2018-04-20 PROCEDURE — 74011250636 HC RX REV CODE- 250/636: Performed by: SURGERY

## 2018-04-20 PROCEDURE — 77030002996 HC SUT SLK J&J -A: Performed by: SURGERY

## 2018-04-20 PROCEDURE — 76060000033 HC ANESTHESIA 1 TO 1.5 HR: Performed by: SURGERY

## 2018-04-20 PROCEDURE — 36200 PLACE CATHETER IN AORTA: CPT

## 2018-04-20 PROCEDURE — 76010000149 HC OR TIME 1 TO 1.5 HR: Performed by: SURGERY

## 2018-04-20 PROCEDURE — 74011000250 HC RX REV CODE- 250

## 2018-04-20 PROCEDURE — 77030002986 HC SUT PROL J&J -A: Performed by: SURGERY

## 2018-04-20 PROCEDURE — 76210000006 HC OR PH I REC 0.5 TO 1 HR: Performed by: SURGERY

## 2018-04-20 PROCEDURE — 77030002933 HC SUT MCRYL J&J -A: Performed by: SURGERY

## 2018-04-20 PROCEDURE — 85018 HEMOGLOBIN: CPT | Performed by: SURGERY

## 2018-04-20 PROCEDURE — 76210000022 HC REC RM PH II 1.5 TO 2 HR: Performed by: SURGERY

## 2018-04-20 PROCEDURE — 75716 ARTERY X-RAYS ARMS/LEGS: CPT

## 2018-04-20 PROCEDURE — 77030021532 HC CATH ANGI DX IMPRS MRTM -B

## 2018-04-20 PROCEDURE — 77030021532 HC CATH ANGI DX IMPRS MRTM -B: Performed by: SURGERY

## 2018-04-20 PROCEDURE — 74011250636 HC RX REV CODE- 250/636

## 2018-04-20 PROCEDURE — C1769 GUIDE WIRE: HCPCS | Performed by: SURGERY

## 2018-04-20 PROCEDURE — 80053 COMPREHEN METABOLIC PANEL: CPT | Performed by: SURGERY

## 2018-04-20 PROCEDURE — C1757 CATH, THROMBECTOMY/EMBOLECT: HCPCS | Performed by: SURGERY

## 2018-04-20 PROCEDURE — 77030031139 HC SUT VCRL2 J&J -A: Performed by: SURGERY

## 2018-04-20 PROCEDURE — 74011250637 HC RX REV CODE- 250/637: Performed by: ANESTHESIOLOGY

## 2018-04-20 PROCEDURE — C1894 INTRO/SHEATH, NON-LASER: HCPCS | Performed by: SURGERY

## 2018-04-20 PROCEDURE — 77030020782 HC GWN BAIR PAWS FLX 3M -B: Performed by: ANESTHESIOLOGY

## 2018-04-20 RX ORDER — CEFAZOLIN SODIUM/WATER 2 G/20 ML
2 SYRINGE (ML) INTRAVENOUS
Status: COMPLETED | OUTPATIENT
Start: 2018-04-20 | End: 2018-04-20

## 2018-04-20 RX ORDER — MORPHINE SULFATE 10 MG/ML
1 INJECTION, SOLUTION INTRAMUSCULAR; INTRAVENOUS
Status: DISCONTINUED | OUTPATIENT
Start: 2018-04-20 | End: 2018-04-20 | Stop reason: HOSPADM

## 2018-04-20 RX ORDER — HYDROMORPHONE HYDROCHLORIDE 2 MG/ML
0.5 INJECTION, SOLUTION INTRAMUSCULAR; INTRAVENOUS; SUBCUTANEOUS
Status: CANCELLED | OUTPATIENT
Start: 2018-04-20

## 2018-04-20 RX ORDER — FENTANYL CITRATE 50 UG/ML
INJECTION, SOLUTION INTRAMUSCULAR; INTRAVENOUS AS NEEDED
Status: DISCONTINUED | OUTPATIENT
Start: 2018-04-20 | End: 2018-04-20 | Stop reason: HOSPADM

## 2018-04-20 RX ORDER — PROTAMINE SULFATE 10 MG/ML
INJECTION, SOLUTION INTRAVENOUS AS NEEDED
Status: DISCONTINUED | OUTPATIENT
Start: 2018-04-20 | End: 2018-04-20 | Stop reason: HOSPADM

## 2018-04-20 RX ORDER — NALOXONE HYDROCHLORIDE 0.4 MG/ML
0.2 INJECTION, SOLUTION INTRAMUSCULAR; INTRAVENOUS; SUBCUTANEOUS AS NEEDED
Status: CANCELLED | OUTPATIENT
Start: 2018-04-20

## 2018-04-20 RX ORDER — SODIUM CHLORIDE 0.9 % (FLUSH) 0.9 %
5-10 SYRINGE (ML) INJECTION AS NEEDED
Status: CANCELLED | OUTPATIENT
Start: 2018-04-20

## 2018-04-20 RX ORDER — HEPARIN SODIUM 1000 [USP'U]/ML
INJECTION, SOLUTION INTRAVENOUS; SUBCUTANEOUS AS NEEDED
Status: DISCONTINUED | OUTPATIENT
Start: 2018-04-20 | End: 2018-04-20 | Stop reason: HOSPADM

## 2018-04-20 RX ORDER — FAMOTIDINE 20 MG/1
20 TABLET, FILM COATED ORAL ONCE
Status: COMPLETED | OUTPATIENT
Start: 2018-04-20 | End: 2018-04-20

## 2018-04-20 RX ORDER — SODIUM CHLORIDE, SODIUM LACTATE, POTASSIUM CHLORIDE, CALCIUM CHLORIDE 600; 310; 30; 20 MG/100ML; MG/100ML; MG/100ML; MG/100ML
75 INJECTION, SOLUTION INTRAVENOUS CONTINUOUS
Status: CANCELLED | OUTPATIENT
Start: 2018-04-20

## 2018-04-20 RX ORDER — SODIUM CHLORIDE, SODIUM LACTATE, POTASSIUM CHLORIDE, CALCIUM CHLORIDE 600; 310; 30; 20 MG/100ML; MG/100ML; MG/100ML; MG/100ML
75 INJECTION, SOLUTION INTRAVENOUS CONTINUOUS
Status: DISCONTINUED | OUTPATIENT
Start: 2018-04-20 | End: 2018-04-20 | Stop reason: HOSPADM

## 2018-04-20 RX ORDER — MIDAZOLAM HYDROCHLORIDE 1 MG/ML
2 INJECTION, SOLUTION INTRAMUSCULAR; INTRAVENOUS
Status: DISCONTINUED | OUTPATIENT
Start: 2018-04-20 | End: 2018-04-20 | Stop reason: HOSPADM

## 2018-04-20 RX ORDER — SODIUM CHLORIDE 9 MG/ML
75 INJECTION, SOLUTION INTRAVENOUS CONTINUOUS
Status: DISCONTINUED | OUTPATIENT
Start: 2018-04-20 | End: 2018-04-20 | Stop reason: HOSPADM

## 2018-04-20 RX ORDER — ONDANSETRON 2 MG/ML
4 INJECTION INTRAMUSCULAR; INTRAVENOUS
Status: DISCONTINUED | OUTPATIENT
Start: 2018-04-20 | End: 2018-04-20 | Stop reason: HOSPADM

## 2018-04-20 RX ORDER — DIPHENHYDRAMINE HYDROCHLORIDE 50 MG/ML
12.5 INJECTION, SOLUTION INTRAMUSCULAR; INTRAVENOUS
Status: DISCONTINUED | OUTPATIENT
Start: 2018-04-20 | End: 2018-04-20 | Stop reason: HOSPADM

## 2018-04-20 RX ORDER — ATENOLOL 50 MG/1
50 TABLET ORAL DAILY
Status: DISCONTINUED | OUTPATIENT
Start: 2018-04-20 | End: 2018-04-20

## 2018-04-20 RX ORDER — EPHEDRINE SULFATE 50 MG/ML
INJECTION, SOLUTION INTRAVENOUS AS NEEDED
Status: DISCONTINUED | OUTPATIENT
Start: 2018-04-20 | End: 2018-04-20 | Stop reason: HOSPADM

## 2018-04-20 RX ORDER — PROPOFOL 10 MG/ML
INJECTION, EMULSION INTRAVENOUS
Status: DISCONTINUED | OUTPATIENT
Start: 2018-04-20 | End: 2018-04-20 | Stop reason: HOSPADM

## 2018-04-20 RX ORDER — METOPROLOL TARTRATE 5 MG/5ML
INJECTION INTRAVENOUS AS NEEDED
Status: DISCONTINUED | OUTPATIENT
Start: 2018-04-20 | End: 2018-04-20 | Stop reason: HOSPADM

## 2018-04-20 RX ORDER — OXYCODONE AND ACETAMINOPHEN 5; 325 MG/1; MG/1
1 TABLET ORAL
Status: DISCONTINUED | OUTPATIENT
Start: 2018-04-20 | End: 2018-04-20 | Stop reason: HOSPADM

## 2018-04-20 RX ORDER — HEPARIN SODIUM 5000 [USP'U]/ML
INJECTION, SOLUTION INTRAVENOUS; SUBCUTANEOUS AS NEEDED
Status: DISCONTINUED | OUTPATIENT
Start: 2018-04-20 | End: 2018-04-20 | Stop reason: HOSPADM

## 2018-04-20 RX ORDER — SODIUM CHLORIDE, SODIUM LACTATE, POTASSIUM CHLORIDE, CALCIUM CHLORIDE 600; 310; 30; 20 MG/100ML; MG/100ML; MG/100ML; MG/100ML
100 INJECTION, SOLUTION INTRAVENOUS CONTINUOUS
Status: DISCONTINUED | OUTPATIENT
Start: 2018-04-20 | End: 2018-04-20 | Stop reason: HOSPADM

## 2018-04-20 RX ORDER — LIDOCAINE HYDROCHLORIDE 10 MG/ML
0.1 INJECTION INFILTRATION; PERINEURAL AS NEEDED
Status: DISCONTINUED | OUTPATIENT
Start: 2018-04-20 | End: 2018-04-20 | Stop reason: HOSPADM

## 2018-04-20 RX ORDER — ATENOLOL 50 MG/1
50 TABLET ORAL
Status: DISCONTINUED | OUTPATIENT
Start: 2018-04-20 | End: 2018-04-20 | Stop reason: HOSPADM

## 2018-04-20 RX ORDER — OXYCODONE AND ACETAMINOPHEN 5; 325 MG/1; MG/1
1 TABLET ORAL AS NEEDED
Status: CANCELLED | OUTPATIENT
Start: 2018-04-20

## 2018-04-20 RX ORDER — PROPOFOL 10 MG/ML
INJECTION, EMULSION INTRAVENOUS AS NEEDED
Status: DISCONTINUED | OUTPATIENT
Start: 2018-04-20 | End: 2018-04-20 | Stop reason: HOSPADM

## 2018-04-20 RX ADMIN — PROTAMINE SULFATE 10 MG: 10 INJECTION, SOLUTION INTRAVENOUS at 08:12

## 2018-04-20 RX ADMIN — SODIUM CHLORIDE, SODIUM LACTATE, POTASSIUM CHLORIDE, AND CALCIUM CHLORIDE 100 ML/HR: 600; 310; 30; 20 INJECTION, SOLUTION INTRAVENOUS at 06:13

## 2018-04-20 RX ADMIN — FENTANYL CITRATE 25 MCG: 50 INJECTION, SOLUTION INTRAMUSCULAR; INTRAVENOUS at 08:06

## 2018-04-20 RX ADMIN — MIDAZOLAM HYDROCHLORIDE 2 MG: 1 INJECTION, SOLUTION INTRAMUSCULAR; INTRAVENOUS at 06:58

## 2018-04-20 RX ADMIN — HEPARIN SODIUM 3000 UNITS: 1000 INJECTION, SOLUTION INTRAVENOUS; SUBCUTANEOUS at 07:37

## 2018-04-20 RX ADMIN — FENTANYL CITRATE 25 MCG: 50 INJECTION, SOLUTION INTRAMUSCULAR; INTRAVENOUS at 07:18

## 2018-04-20 RX ADMIN — PROPOFOL 70 MG: 10 INJECTION, EMULSION INTRAVENOUS at 07:08

## 2018-04-20 RX ADMIN — PROTAMINE SULFATE 10 MG: 10 INJECTION, SOLUTION INTRAVENOUS at 08:11

## 2018-04-20 RX ADMIN — EPHEDRINE SULFATE 10 MG: 50 INJECTION, SOLUTION INTRAVENOUS at 07:32

## 2018-04-20 RX ADMIN — METOPROLOL TARTRATE 1 MG: 5 INJECTION INTRAVENOUS at 07:07

## 2018-04-20 RX ADMIN — Medication 2 G: at 07:12

## 2018-04-20 RX ADMIN — PROPOFOL 200 MCG/KG/MIN: 10 INJECTION, EMULSION INTRAVENOUS at 07:08

## 2018-04-20 RX ADMIN — FAMOTIDINE 20 MG: 20 TABLET, FILM COATED ORAL at 07:00

## 2018-04-20 NOTE — ANESTHESIA POSTPROCEDURE EVALUATION
Post-Anesthesia Evaluation and Assessment    Patient: Shiraz Viramontes Sr. MRN: [id number]  SSN: [id number]    YOB: 1955  Age: 60 y.o. Sex: male       Cardiovascular Function/Vital Signs  Visit Vitals    BP 92/53    Pulse 69    Temp 36.3 °C (97.4 °F)    Resp 20    Ht 5' 10\" (1.778 m)    Wt 81.8 kg (180 lb 7 oz)    SpO2 95%    BMI 25.89 kg/m2       Patient is status post general anesthesia for Procedure(s):  ULTRASOUND GUIDED ACCESS AORTAGRAM BILATERAL LOWER EXTREMITY ARTERIOGRAM .    Nausea/Vomiting: None    Postoperative hydration reviewed and adequate. Pain:  Pain Scale 1: Numeric (0 - 10) (04/20/18 0555)  Pain Intensity 1: 0 (04/20/18 0555)   Managed    Neurological Status:   Neuro (WDL): Within Defined Limits (04/20/18 0600)   At baseline    Mental Status and Level of Consciousness: Arousable    Pulmonary Status:   O2 Device: Nasal cannula (04/20/18 0831)   Adequate oxygenation and airway patent    Complications related to anesthesia: None    Post-anesthesia assessment completed.  No concerns    Signed By: Kristin Jama MD     April 20, 2018

## 2018-04-20 NOTE — ANESTHESIA PREPROCEDURE EVALUATION
Anesthetic History   No history of anesthetic complications            Review of Systems / Medical History  Patient summary reviewed and pertinent labs reviewed    Pulmonary          Smoker (former 45 pack years)  Asthma     Comments: Quit smoking about one year ago   Neuro/Psych       CVA: no residual symptoms       Cardiovascular    Hypertension: well controlled              Exercise tolerance: <4 METS     GI/Hepatic/Renal           Hiatal hernia     Endo/Other             Other Findings   Comments: CVA 2021 taking plavix           Physical Exam    Airway  Mallampati: II  TM Distance: 4 - 6 cm  Neck ROM: normal range of motion   Mouth opening: Normal     Cardiovascular    Rhythm: regular           Dental    Dentition: Full upper dentures and Full lower dentures     Pulmonary  Breath sounds clear to auscultation               Abdominal  GI exam deferred       Other Findings            Anesthetic Plan    ASA: 3  Anesthesia type: general          Induction: Intravenous  Anesthetic plan and risks discussed with: Patient

## 2018-04-20 NOTE — IP AVS SNAPSHOT
[address] [facility] 
 
 
 [address] [facility] [address] 
[phone] Patient: Dunia Rucker Sr. MRN: [id number] REJI:[id number] About your hospitalization You were admitted on:  April 20, 2018 You last received care in the:  [facility] PACU You were discharged on:  April 20, 2018 Why you were hospitalized Your primary diagnosis was:  Not on File Follow-up Information Follow up With Details Comments Contact Info Eulalia Moran MD   [address] [facility] Internal Medicine an 
[place] [address] 
[phone] Kari Weber MD Go on 5/8/2018 1:30 pm [address] [address] 
[phone] Your Scheduled Appointments Tuesday May 08, 2018  1:30 PM EDT  
ESTABLISHED PATIENT with Roseline Gerardo NP  
[facility] VASCULAR SURGERY ([facility]) [address] [address] [phone] Discharge Orders None A check juan indicates which time of day the medication should be taken. My Medications CONTINUE taking these medications Instructions Each Dose to Equal  
 Morning Noon Evening Bedtime  
 amLODIPine 10 mg tablet Commonly known as:  Cameron Rakeila Your last dose was: Your next dose is: Take 10 mg by mouth every morning. 10 mg  
    
   
   
   
  
 atenolol 50 mg tablet Commonly known as:  TENORMIN Your last dose was: Your next dose is: Take 50 mg by mouth every morning. 50 mg  
    
   
   
   
  
 atorvastatin 40 mg tablet Commonly known as:  LIPITOR Your last dose was: Your next dose is: Take 40 mg by mouth nightly. 40 mg  
    
   
   
   
  
 ferrous sulfate 325 mg (65 mg iron) tablet Your last dose was: Your next dose is: Take  by mouth Daily (before breakfast). lisinopril 10 mg tablet Commonly known as:  Trung Boehringer Your last dose was: Your next dose is: Take 10 mg by mouth every morning. 10 mg  
    
   
   
   
  
 PLAVIX 75 mg Tab Generic drug:  clopidogrel Your last dose was: Your next dose is: Take 75 mg by mouth every morning. 75 mg  
    
   
   
   
  
 tamsulosin 0.4 mg capsule Commonly known as:  FLOMAX Your last dose was: Your next dose is: Take 0.4 mg by mouth nightly. 0.4 mg Discharge Instructions Arteriogram: What to Expect at [facility] Your Recovery The doctor inserted a thin, flexible tube (catheter) into a blood vessel in your groin. In some cases, the catheter is placed in a blood vessel in the arm. After an arteriogram, your groin or arm may have a bruise and feel sore for a day or two. You can do light activities around the house but nothing strenuous for several days. Your doctor may give you specific instructions on when you can do your normal activities again, such as driving and going back to work. This care sheet gives you a general idea about how long it will take for you to recover. But each person recovers at a different pace. Follow the steps below to feel better as quickly as possible. How can you care for yourself at home? Activity · Do not do strenuous exercise and do not lift, pull, or push anything heavy until your doctor says it is okay. This may be for a day or two. You can walk around the house and do light activity, such as cooking. · You may shower 24 to 48 hours after the procedure, if your doctor okays it. Pat the incision dry. Do not take a bath for 1 week, or until your doctor tells you it is okay. · If the catheter was placed in your groin, try not to walk up stairs for the first couple of days. · If your doctor recommends it, get more exercise.  Walking is a good choice. Bit by bit, increase the amount you walk every day. Try for at least 30 minutes on most days of the week. Diet · Drink plenty of fluids to help your body flush out the dye. If you have kidney, heart, or liver disease and have to limit fluids, talk with your doctor before you increase the amount of fluids you drink. · You can eat your normal diet. If your stomach is upset, try bland, low-fat foods like plain rice, broiled chicken, toast, and yogurt. Medicines · Be safe with medicines. Read and follow all instructions on the label. ¨ If the doctor gave you a prescription medicine for pain, take it as prescribed. ¨ If you are not taking a prescription pain medicine, ask your doctor if you can take an over-the-counter medicine. · If you take blood thinners, such as warfarin (Coumadin), clopidogrel (Plavix), or aspirin, be sure to talk to your doctor. He or she will tell you if and when to start taking those medicines again. Make sure that you understand exactly what your doctor wants you to do. · Your doctor will tell you if and when you can restart your medicines. He or she will also give you instructions about taking any new medicines. Care of the catheter site · You will have a dressing over the cut (incision). A dressing helps the incision heal and protects it. Your doctor will tell you how to take care of this. · Put ice or a cold pack on the area for 10 to 20 minutes at a time to help with soreness or swelling. Put a thin cloth between the ice and your skin. Follow-up care is a key part of your treatment and safety. Be sure to make and go to all appointments, and call your doctor if you are having problems. It's also a good idea to know your test results and keep a list of the medicines you take. When should you call for help? Call 911 anytime you think you may need emergency care. For example, call if: 
· You passed out (lost consciousness). · You have severe trouble breathing. · You have sudden chest pain and shortness of breath, or you cough up blood. Call your doctor now or seek immediate medical care if: 
· You are bleeding from the area where the catheter was put in your artery. · You have a fast-growing, painful lump at the catheter site. · You have signs of infection, such as: 
¨ Increased pain, swelling, warmth, or redness. ¨ Red streaks leading from the incision. ¨ Pus draining from the incision. ¨ A fever. Watch closely for any changes in your health, and be sure to contact your doctor if: 
· You don't get better as expected. After general anesthesia or intravenous sedation, for 24 hours or while taking prescription Narcotics: · Limit your activities · Do not drive and operate hazardous machinery · Do not make important personal or business decisions · Do  not drink alcoholic beverages · If you have not urinated within 8 hours after discharge, please contact your surgeon on call. *  Please give a list of your current medications to your Primary Care Provider. *  Please update this list whenever your medications are discontinued, doses are 
    changed, or new medications (including over-the-counter products) are added. *  Please carry medication information at all times in case of emergency situations. These are general instructions for a healthy lifestyle: No smoking/ No tobacco products/ Avoid exposure to second hand smoke Surgeon General's Warning:  Quitting smoking now greatly reduces serious risk to your health. Obesity, smoking, and sedentary lifestyle greatly increases your risk for illness A healthy diet, regular physical exercise & weight monitoring are important for maintaining a healthy lifestyle You may be retaining fluid if you have a history of heart failure or if you experience any of the following symptoms:  Weight gain of 3 pounds or more overnight or 5 pounds in a week, increased swelling in our hands or feet or shortness of breath while lying flat in bed. Please call your doctor as soon as you notice any of these symptoms; do not wait until your next office visit. Recognize signs and symptoms of STROKE: 
 
F-face looks uneven A-arms unable to move or move unevenly S-speech slurred or non-existent T-time-call 911 as soon as signs and symptoms begin-DO NOT go Back to bed or wait to see if you get better-TIME IS BRAIN. Introducing [facility]! Swathi Lombardi introduces Lingoing patient portal. Now you can access parts of your medical record, email your doctor's office, and request medication refills online. 1. In your internet browser, go to [email or web address] 2. Click on the First Time User? Click Here link in the Sign In box. You will see the New Member Sign Up page. 3. Enter your Lingoing Access Code exactly as it appears below. You will not need to use this code after youve completed the sign-up process. If you do not sign up before the expiration date, you must request a new code. · Lingoing Access Code: [email or web address] Expires: 7/18/2018  2:50 PM 
 
4. Enter the last four digits of your Social Security Number (xxxx) and Date of Birth (mm/dd/yyyy) as indicated and click Submit. You will be taken to the next sign-up page. 5. Create a Lingoing ID. This will be your Lingoing login ID and cannot be changed, so think of one that is secure and easy to remember. 6. Create a Lingoing password. You can change your password at any time. 7. Enter your Password Reset Question and Answer. This can be used at a later time if you forget your password. 8. Enter your e-mail address. You will receive e-mail notification when new information is available in [address] 9. Click Sign Up. You can now view and download portions of your medical record. 10. Click the Download Summary menu link to download a portable copy of your medical information. If you have questions, please visit the Frequently Asked Questions section of the MyChart website. Remember, AppHero is NOT to be used for urgent needs. For medical emergencies, dial 911. Now available from your iPhone and Android! Introducing Gaetano Thompson As a [facility] patient, I wanted to make you aware of our electronic visit tool called Gaetano Thompson. [employer] 24/7 allows you to connect within minutes with a medical provider 24 hours a day, seven days a week via a mobile device or tablet or logging into a secure website from your computer. You can access Gaetano Thompson from anywhere in the [place]. A virtual visit might be right for you when you have a simple condition and feel like you just dont want to get out of bed, or cant get away from work for an appointment, when your regular [facility] provider is not available (evenings, weekends or holidays), or when youre out of town and need minor care. Electronic visits cost only $49 and if the Suematt FariasAccela/7 provider determines a prescription is needed to treat your condition, one can be electronically transmitted to a nearby pharmacy*. Please take a moment to enroll today if you have not already done so. The enrollment process is free and takes just a few minutes. To enroll, please download the Reclog/Dizko Samurai rafael to your tablet or phone, or visit [email or web address] to enroll on your computer. And, as an [address] [place] [address] patient with a [employer] account, the results of your visits will be scanned into your electronic medical record and your primary care provider will be able to view the scanned results. We urge you to continue to see your regular [facility] provider for your ongoing medical care.   And while your primary care provider may not be the one available when you seek a Gaetano Thompson virtual visit, the peace of mind you get from getting a real diagnosis real time can be priceless. For more information on Gaetano Thompson, view our Frequently Asked Questions (FAQs) at [email or web address]. Sincerely, 
 
Jerica Londono MD 
Chief Medical Officer [address] *:  certain medications cannot be prescribed via Gaetano Thompson Unresulted Labs-Please follow up with your PCP about these lab tests Order Current Status IR AORTOGRAPHY ABDOMINAL SERIAL In process Providers Seen During Your Hospitalization Provider Specialty Primary office phone Arabella Peterson MD Vascular Surgery [phone] Your Primary Care Physician (PCP) Primary Care Physician Office Phone Office Fax Coretta Benton [phone] You are allergic to the following Allergen Reactions Shellfish Derived Anaphylaxis And any fresh fruit (processed fruit is tolerable) Recent Documentation Height Weight BMI Smoking Status 1.778 m 81.8 kg 25.89 kg/m2 Former Smoker Emergency Contacts Name Discharge Info Relation Home Work Mobile Judith Guzman DISCHARGE CAREGIVER [3] Spouse [3] [phone] Patient Belongings The following personal items are in your possession at time of discharge: 
  Dental Appliances: Lowers, Uppers  Visual Aid: Glasses      Home Medications: None   Jewelry: None  Clothing: Footwear, Pants, Shirt, Socks, Undergarments, Jacket/Coat    Other Valuables: Eyeglasses  Personal Items Sent to Safe: none Please provide this summary of care documentation to your next provider. Signatures-by signing, you are acknowledging that this After Visit Summary has been reviewed with you and you have received a copy. Patient Signature:  ____________________________________________________________  Date:  ____________________________________________________________  
  
Bayron Jeanie    
 Provider Signature:  ____________________________________________________________ Date:  ____________________________________________________________

## 2018-04-20 NOTE — DISCHARGE INSTRUCTIONS
Arteriogram: What to Expect at [address] [place] [address] Drive inserted a thin, flexible tube (catheter) into a blood vessel in your groin. In some cases, the catheter is placed in a blood vessel in the arm. After an arteriogram, your groin or arm may have a bruise and feel sore for a day or two. You can do light activities around the house but nothing strenuous for several days. Your doctor may give you specific instructions on when you can do your normal activities again, such as driving and going back to work. This care sheet gives you a general idea about how long it will take for you to recover. But each person recovers at a different pace. Follow the steps below to feel better as quickly as possible. How can you care for yourself at home? Activity  · Do not do strenuous exercise and do not lift, pull, or push anything heavy until your doctor says it is okay. This may be for a day or two. You can walk around the house and do light activity, such as cooking. · You may shower 24 to 48 hours after the procedure, if your doctor okays it. Pat the incision dry. Do not take a bath for 1 week, or until your doctor tells you it is okay. · If the catheter was placed in your groin, try not to walk up stairs for the first couple of days. · If your doctor recommends it, get more exercise. Walking is a good choice. Bit by bit, increase the amount you walk every day. Try for at least 30 minutes on most days of the week. Diet  · Drink plenty of fluids to help your body flush out the dye. If you have kidney, heart, or liver disease and have to limit fluids, talk with your doctor before you increase the amount of fluids you drink. · You can eat your normal diet. If your stomach is upset, try bland, low-fat foods like plain rice, broiled chicken, toast, and yogurt. Medicines  · Be safe with medicines. Read and follow all instructions on the label.   ¨ If the doctor gave you a prescription medicine for pain, take it as prescribed. ¨ If you are not taking a prescription pain medicine, ask your doctor if you can take an over-the-counter medicine. · If you take blood thinners, such as warfarin (Coumadin), clopidogrel (Plavix), or aspirin, be sure to talk to your doctor. He or she will tell you if and when to start taking those medicines again. Make sure that you understand exactly what your doctor wants you to do. · Your doctor will tell you if and when you can restart your medicines. He or she will also give you instructions about taking any new medicines. Care of the catheter site  · You will have a dressing over the cut (incision). A dressing helps the incision heal and protects it. Your doctor will tell you how to take care of this. · Put ice or a cold pack on the area for 10 to 20 minutes at a time to help with soreness or swelling. Put a thin cloth between the ice and your skin. Follow-up care is a key part of your treatment and safety. Be sure to make and go to all appointments, and call your doctor if you are having problems. It's also a good idea to know your test results and keep a list of the medicines you take. When should you call for help? Call 911 anytime you think you may need emergency care. For example, call if:  · You passed out (lost consciousness). · You have severe trouble breathing. · You have sudden chest pain and shortness of breath, or you cough up blood. Call your doctor now or seek immediate medical care if:  · You are bleeding from the area where the catheter was put in your artery. · You have a fast-growing, painful lump at the catheter site. · You have signs of infection, such as:  ¨ Increased pain, swelling, warmth, or redness. ¨ Red streaks leading from the incision. ¨ Pus draining from the incision. ¨ A fever. Watch closely for any changes in your health, and be sure to contact your doctor if:  · You don't get better as expected.   After general anesthesia or intravenous sedation, for 24 hours or while taking prescription Narcotics:  · Limit your activities  · Do not drive and operate hazardous machinery  · Do not make important personal or business decisions  · Do  not drink alcoholic beverages  · If you have not urinated within 8 hours after discharge, please contact your surgeon on call. *  Please give a list of your current medications to your Primary Care Provider. *  Please update this list whenever your medications are discontinued, doses are      changed, or new medications (including over-the-counter products) are added. *  Please carry medication information at all times in case of emergency situations. These are general instructions for a healthy lifestyle:  No smoking/ No tobacco products/ Avoid exposure to second hand smoke  Surgeon General's Warning:  Quitting smoking now greatly reduces serious risk to your health. Obesity, smoking, and sedentary lifestyle greatly increases your risk for illness  A healthy diet, regular physical exercise & weight monitoring are important for maintaining a healthy lifestyle    You may be retaining fluid if you have a history of heart failure or if you experience any of the following symptoms:  Weight gain of 3 pounds or more overnight or 5 pounds in a week, increased swelling in our hands or feet or shortness of breath while lying flat in bed. Please call your doctor as soon as you notice any of these symptoms; do not wait until your next office visit. Recognize signs and symptoms of STROKE:    F-face looks uneven    A-arms unable to move or move unevenly    S-speech slurred or non-existent    T-time-call 911 as soon as signs and symptoms begin-DO NOT go       Back to bed or wait to see if you get better-TIME IS BRAIN.

## 2018-04-20 NOTE — BRIEF OP NOTE
BRIEF OPERATIVE NOTE    Date of Procedure: 4/20/2018   Preoperative Diagnosis: Thrombosis of femoro-popliteal bypass graft, initial encounter ([facility] [address] [W71.403O]  Postoperative Diagnosis:  Thrombosis of femoro-popliteal bypass graft, initial encounter ([facility] [address] [M73.526G]    Procedure(s):  ULTRASOUND GUIDED ACCESS AORTAGRAM BILATERAL LOWER EXTREMITY ARTERIOGRAM   Surgeon(s) and Role:     * Noemi Sánchez MD - Primary         Surgical Assistant:     Surgical Staff:  Circ-1: Licha Rudd RN  Radiology Technician: Bonita Miranda, RT, R, CT  Event Time In   Incision Start 1846   Incision Close      Anesthesia: General   Estimated Blood Loss: 25cc  Specimens: * No specimens in log *   Findings:    Complications: None  Implants: * No implants in log *

## 2018-04-20 NOTE — PROGRESS NOTES
's Pre-op visit requested by patient. Conveyed care and concern for patient and family. Offered prayer as requested for patient, family, and staff.     Shawn King MDiv, BS  Board Certified

## 2018-04-26 NOTE — PROCEDURES
[facility] NOTE    Name:Scott RODRIGUEZ SR.  MR#: [id number]  : 1955  ACCOUNT #: [de-identified]   DATE OF SERVICE: 2018    DATE OF PROCEDURE:  2018    PREOPERATIVE DIAGNOSIS:  Right lower extremity ischemia. POSTOPERATIVE DIAGNOSIS:  Right lower extremity ischemia. PROCEDURE:  Aortogram, bilateral lower extremity arteriogram, ultrasound-guided femoral access. SURGEON:  Ralph Chaudhry MD     ANESTHESIA:  IV sedation plus 1% lidocaine as local.      TOTAL CONTRAST:  116 mL. TOTAL FLUOROSCOPY TIME:  17 minutes. DESCRIPTION OF PROCEDURE:  The patient was brought to the angio suite, placed on angio table in supine position. Following adequate IV sedation and timeout procedure, the groins were draped and prepped in sterile fashion. The left common femoral artery was then percutaneously punctured under direct vision using ultrasound. A 5 Armenian sheath was placed over a guidewire via Seldinger technique. Next, a 0.035 guidewire was advanced in the aorta followed by a 5-Armenian Omniflush catheter. Abdominal aortogram was performed. Catheter was pulled down just above the aortic bifurcation where bilateral lower extremity bolus tameka imaging was performed. Next, the catheter was used to direct a guidewire over to the right side. Selective images of the right lower extremity were performed. Multiple attempts were made to recannulate the occluded PTFE graft on this side but these were unsuccessful. At this point, guidewires and catheters were removed. The puncture site was closed with a Mynx closure device. Patient tolerated procedure well. No complications. ANGIOGRAPHIC FINDINGS:  The abdominal aorta was of normal caliber. There are single renal arteries bilaterally, which are widely patent. Common iliac arteries are normal in appearance. The hypogastric arteries are patent. External iliac arteries are patent bilaterally.   On the left, common femoral artery is patent. There is aneurysmal degeneration at what appears to be a proximal anastomosis of a left femoral to above knee popliteal artery bypass. The profunda femoris is patent. The bypass graft is patent. The popliteal artery is patent, although somewhat irregular. There appears to be single vessel runoff through the peroneal artery to the ankle and ultimately to the foot with refilling of the posterior tibial artery. On the right, the common femoral artery is patent. The PTFE bypass graft is chronically occluded. The profunda femoris is patent. There is reconstitution of the popliteal artery just above the knee. Below that, the popliteal artery is quite small. There is single vessel runoff to the ankle with reconstitution of a normal appearing posterior tibial artery just above the ankle filling onto the foot via the plantar branches. IMPRESSION:  Right fem-pop bypass graft occlusion, widely patent left fem-pop bypass, bilateral tibial artery occlusive disease as described.       MD Jacques Martínez / Avani Ordoñez  D: 04/26/2018 13:04     T: 04/26/2018 14:52  JOB #: [id number]

## 2018-05-22 ENCOUNTER — ANESTHESIA EVENT (OUTPATIENT)
Dept: SURGERY | Age: 63
DRG: 254 | End: 2018-05-22
Payer: COMMERCIAL

## 2018-05-22 ENCOUNTER — HOSPITAL ENCOUNTER (OUTPATIENT)
Dept: SURGERY | Age: 63
Discharge: HOME | End: 2018-05-22
Payer: COMMERCIAL

## 2018-05-22 VITALS
OXYGEN SATURATION: 98 % | WEIGHT: 186.38 LBS | HEIGHT: 70 IN | RESPIRATION RATE: 18 BRPM | DIASTOLIC BLOOD PRESSURE: 94 MMHG | SYSTOLIC BLOOD PRESSURE: 139 MMHG | TEMPERATURE: 98 F | BODY MASS INDEX: 26.68 KG/M2 | HEART RATE: 122 BPM

## 2018-05-22 LAB
ANION GAP SERPL CALC-SCNC: 7 MMOL/L (ref 7–16)
APTT PPP: 27.9 SEC (ref 23.2–35.3)
BUN SERPL-MCNC: 12 MG/DL (ref 8–23)
CALCIUM SERPL-MCNC: 9.2 MG/DL (ref 8.3–10.4)
CHLORIDE SERPL-SCNC: 107 MMOL/L (ref 98–107)
CO2 SERPL-SCNC: 30 MMOL/L (ref 21–32)
CREAT SERPL-MCNC: 1.5 MG/DL (ref 0.8–1.5)
ERYTHROCYTE [DISTWIDTH] IN BLOOD BY AUTOMATED COUNT: 14.3 % (ref 11.9–14.6)
GLUCOSE SERPL-MCNC: 103 MG/DL (ref 65–100)
HCT VFR BLD AUTO: 39.7 % (ref 41.1–50.3)
HGB BLD-MCNC: 12.8 G/DL (ref 13.6–17.2)
INR PPP: 1
MCH RBC QN AUTO: 28.1 PG (ref 26.1–32.9)
MCHC RBC AUTO-ENTMCNC: 32.2 G/DL (ref 31.4–35)
MCV RBC AUTO: 87.1 FL (ref 79.6–97.8)
PLATELET # BLD AUTO: 243 K/UL (ref 150–450)
PMV BLD AUTO: 12.2 FL (ref 10.8–14.1)
POTASSIUM SERPL-SCNC: 4.5 MMOL/L (ref 3.5–5.1)
PROTHROMBIN TIME: 12.4 SEC (ref 11.5–14.5)
RBC # BLD AUTO: 4.56 M/UL (ref 4.23–5.67)
SODIUM SERPL-SCNC: 144 MMOL/L (ref 136–145)
WBC # BLD AUTO: 7.4 K/UL (ref 4.3–11.1)

## 2018-05-22 PROCEDURE — 85027 COMPLETE CBC AUTOMATED: CPT | Performed by: SURGERY

## 2018-05-22 PROCEDURE — 85730 THROMBOPLASTIN TIME PARTIAL: CPT | Performed by: SURGERY

## 2018-05-22 PROCEDURE — 80048 BASIC METABOLIC PNL TOTAL CA: CPT | Performed by: SURGERY

## 2018-05-22 PROCEDURE — 85610 PROTHROMBIN TIME: CPT | Performed by: SURGERY

## 2018-05-22 PROCEDURE — 36415 COLL VENOUS BLD VENIPUNCTURE: CPT | Performed by: SURGERY

## 2018-05-22 NOTE — PERIOP NOTES
Recent Results (from the past 12 hour(s))   CBC W/O DIFF    Collection Time: 05/22/18 11:30 AM   Result Value Ref Range    WBC 7.4 4.3 - 11.1 K/uL    RBC 4.56 4.23 - 5.67 M/uL    HGB 12.8 (L) 13.6 - 17.2 g/dL    HCT 39.7 (L) 41.1 - 50.3 %    MCV 87.1 79.6 - 97.8 FL    MCH 28.1 26.1 - 32.9 PG    MCHC 32.2 31.4 - 35.0 g/dL    RDW 14.3 11.9 - 14.6 %    PLATELET 801 726 - 844 K/uL    MPV 12.2 10.8 - 83.5 FL   METABOLIC PANEL, BASIC    Collection Time: 05/22/18 11:30 AM   Result Value Ref Range    Sodium 144 136 - 145 mmol/L    Potassium 4.5 3.5 - 5.1 mmol/L    Chloride 107 98 - 107 mmol/L    CO2 30 21 - 32 mmol/L    Anion gap 7 7 - 16 mmol/L    Glucose 103 (H) 65 - 100 mg/dL    BUN 12 8 - 23 MG/DL    Creatinine 1.50 0.8 - 1.5 MG/DL    GFR est AA >60 >60 ml/min/1.73m2    GFR est non-AA 50 (L) >60 ml/min/1.73m2    Calcium 9.2 8.3 - 10.4 MG/DL   PROTHROMBIN TIME + INR    Collection Time: 05/22/18 11:30 AM   Result Value Ref Range    Prothrombin time 12.4 11.5 - 14.5 sec    INR 1.0     PTT    Collection Time: 05/22/18 11:30 AM   Result Value Ref Range    aPTT 27.9 23.2 - 35.3 SEC   Reviewed

## 2018-05-22 NOTE — ANESTHESIA PREPROCEDURE EVALUATION
Anesthetic History   No history of anesthetic complications            Review of Systems / Medical History  Patient summary reviewed and pertinent labs reviewed    Pulmonary          Smoker (former 45 pack years)  Asthma     Comments: Quit smoking about one year ago   Neuro/Psych       CVA: no residual symptoms       Cardiovascular    Hypertension          PAD    Exercise tolerance: <4 METS     GI/Hepatic/Renal           Hiatal hernia     Endo/Other             Other Findings   Comments: CVA 2021 taking plavix         Physical Exam    Airway  Mallampati: II  TM Distance: 4 - 6 cm  Neck ROM: normal range of motion   Mouth opening: Normal     Cardiovascular    Rhythm: regular  Rate: normal         Dental    Dentition: Full upper dentures and Full lower dentures     Pulmonary  Breath sounds clear to auscultation               Abdominal  GI exam deferred       Other Findings            Anesthetic Plan    ASA: 3  Anesthesia type: general    Monitoring Plan: Arterial line      Induction: Intravenous  Anesthetic plan and risks discussed with: Patient and Spouse

## 2018-05-22 NOTE — PERIOP NOTES
Patient verified name, , and surgery as listed in [facility]. Patient provided medical/health information and PTA medications to the best of their ability. TYPE  CASE:3  Orders per surgeon: Received and dated 5/10/18. Labs per surgeon:cbc,bmp,pt,ptt. Results: pending  Labs per anesthesia protocol: cbc,bmp. Results pending  EKG  :  Call placed to Dr Billy Friend office to request last EKG. Office to send EKG from 2018  Dr [employer] in to see pt. Chart reviewed and pt approved for surgery. Patient provided with and instructed on education handouts including Guide to Surgery, blood transfusions, pain management, and hand hygiene for the family and community, and [facility] brochure. Hibiclens and instructions given per hospital policy. Instructed patient to continue previous medications as prescribed prior to surgery unless otherwise directed and to take the following medications the day of surgery according to anesthesia guidelines : amlodipine, atenolol  . Instructed patient to hold  the following medications: Plavix per order. Pt states last dose 18. Original medication prescription bottles not visualized during patient appointment. Patient teach back successful and patient demonstrates knowledge of instruction.

## 2018-05-23 NOTE — PERIOP NOTES
Spoke with medical records staff at Dr. Michelle eKvin office regarding EKG 1/2018. She stated she had spoken to staff in pre-assessment yesterday as this ekg was the only ekg they have and the legibility of it is hard to read. She will resend that ekg, as this ekg is not with chart. Received EKG--no able to read. Dr. Faby Easley reviewed ekg and checked electronic record. No orders received.

## 2018-05-29 ENCOUNTER — HOSPITAL ENCOUNTER (INPATIENT)
Age: 63
LOS: 2 days | Discharge: HOME HEALTH | DRG: 254 | End: 2018-05-31
Attending: SURGERY | Admitting: SURGERY
Payer: COMMERCIAL

## 2018-05-29 ENCOUNTER — APPOINTMENT (OUTPATIENT)
Dept: GENERAL RADIOLOGY | Age: 63
DRG: 254 | End: 2018-05-29
Attending: SURGERY
Payer: COMMERCIAL

## 2018-05-29 ENCOUNTER — ANESTHESIA (OUTPATIENT)
Dept: SURGERY | Age: 63
DRG: 254 | End: 2018-05-29
Payer: COMMERCIAL

## 2018-05-29 DIAGNOSIS — I99.8 ISCHEMIA OF RIGHT LOWER EXTREMITY: Primary | ICD-10-CM

## 2018-05-29 LAB
ABO + RH BLD: NORMAL
BLOOD GROUP ANTIBODIES SERPL: NORMAL
SPECIMEN EXP DATE BLD: NORMAL

## 2018-05-29 PROCEDURE — 0T7D7ZZ DILATION OF URETHRA, VIA NATURAL OR ARTIFICIAL OPENING: ICD-10-PCS | Performed by: UROLOGY

## 2018-05-29 PROCEDURE — C1768 GRAFT, VASCULAR: HCPCS | Performed by: SURGERY

## 2018-05-29 PROCEDURE — 77030013794 HC KT TRNSDUC BLD EDWD -B: Performed by: ANESTHESIOLOGY

## 2018-05-29 PROCEDURE — 77030008477 HC STYL SATN SLP COVD -A: Performed by: ANESTHESIOLOGY

## 2018-05-29 PROCEDURE — 77030020782 HC GWN BAIR PAWS FLX 3M -B: Performed by: ANESTHESIOLOGY

## 2018-05-29 PROCEDURE — 74011000258 HC RX REV CODE- 258: Performed by: SURGERY

## 2018-05-29 PROCEDURE — 86900 BLOOD TYPING SEROLOGIC ABO: CPT | Performed by: ANESTHESIOLOGY

## 2018-05-29 PROCEDURE — 74011000250 HC RX REV CODE- 250: Performed by: SURGERY

## 2018-05-29 PROCEDURE — 77030019908 HC STETH ESOPH SIMS -A: Performed by: ANESTHESIOLOGY

## 2018-05-29 PROCEDURE — 74011250636 HC RX REV CODE- 250/636: Performed by: ANESTHESIOLOGY

## 2018-05-29 PROCEDURE — 77030034850: Performed by: SURGERY

## 2018-05-29 PROCEDURE — 74011000250 HC RX REV CODE- 250

## 2018-05-29 PROCEDURE — 77030010512 HC APPL CLP LIG J&J -C: Performed by: SURGERY

## 2018-05-29 PROCEDURE — 76210000016 HC OR PH I REC 1 TO 1.5 HR: Performed by: SURGERY

## 2018-05-29 PROCEDURE — 76060000039 HC ANESTHESIA 4 TO 4.5 HR: Performed by: SURGERY

## 2018-05-29 PROCEDURE — 77030002987 HC SUT PROL J&J -B: Performed by: SURGERY

## 2018-05-29 PROCEDURE — 76010000175 HC OR TIME 4 TO 4.5 HR INTENSV-TIER 1: Performed by: SURGERY

## 2018-05-29 PROCEDURE — 74011250636 HC RX REV CODE- 250/636

## 2018-05-29 PROCEDURE — 77030014008 HC SPNG HEMSTAT J&J -C: Performed by: SURGERY

## 2018-05-29 PROCEDURE — 77030020788: Performed by: SURGERY

## 2018-05-29 PROCEDURE — 65660000004 HC RM CVT STEPDOWN

## 2018-05-29 PROCEDURE — 77030031139 HC SUT VCRL2 J&J -A: Performed by: SURGERY

## 2018-05-29 PROCEDURE — C1757 CATH, THROMBECTOMY/EMBOLECT: HCPCS | Performed by: SURGERY

## 2018-05-29 PROCEDURE — 77030013292 HC BOWL MX PRSM J&J -A: Performed by: ANESTHESIOLOGY

## 2018-05-29 PROCEDURE — 77030008703 HC TU ET UNCUF COVD -A: Performed by: ANESTHESIOLOGY

## 2018-05-29 PROCEDURE — 77030005401 HC CATH RAD ARRO -A: Performed by: ANESTHESIOLOGY

## 2018-05-29 PROCEDURE — 77030002986 HC SUT PROL J&J -A: Performed by: SURGERY

## 2018-05-29 PROCEDURE — 74011250636 HC RX REV CODE- 250/636: Performed by: SURGERY

## 2018-05-29 PROCEDURE — 74011250637 HC RX REV CODE- 250/637: Performed by: ANESTHESIOLOGY

## 2018-05-29 PROCEDURE — 77030011640 HC PAD GRND REM COVD -A: Performed by: SURGERY

## 2018-05-29 PROCEDURE — 74011250637 HC RX REV CODE- 250/637: Performed by: SURGERY

## 2018-05-29 PROCEDURE — 77030008467 HC STPLR SKN COVD -B: Performed by: SURGERY

## 2018-05-29 PROCEDURE — 77030018836 HC SOL IRR NACL ICUM -A: Performed by: SURGERY

## 2018-05-29 PROCEDURE — 77030002996 HC SUT SLK J&J -A: Performed by: SURGERY

## 2018-05-29 DEVICE — XENOSURE BIOLOGIC PATCH, 0.8CM X 8CM, EIFU
Type: IMPLANTABLE DEVICE | Site: LEG | Status: FUNCTIONAL
Brand: XENOSURE BIOLOGIC PATCH

## 2018-05-29 RX ORDER — LIDOCAINE HYDROCHLORIDE 10 MG/ML
0.1 INJECTION INFILTRATION; PERINEURAL AS NEEDED
Status: DISCONTINUED | OUTPATIENT
Start: 2018-05-29 | End: 2018-05-29 | Stop reason: HOSPADM

## 2018-05-29 RX ORDER — LIDOCAINE HYDROCHLORIDE 10 MG/ML
INJECTION INFILTRATION; PERINEURAL AS NEEDED
Status: DISCONTINUED | OUTPATIENT
Start: 2018-05-29 | End: 2018-05-29 | Stop reason: HOSPADM

## 2018-05-29 RX ORDER — GLYCOPYRROLATE 0.2 MG/ML
INJECTION INTRAMUSCULAR; INTRAVENOUS AS NEEDED
Status: DISCONTINUED | OUTPATIENT
Start: 2018-05-29 | End: 2018-05-29 | Stop reason: HOSPADM

## 2018-05-29 RX ORDER — HEPARIN SODIUM 1000 [USP'U]/ML
INJECTION, SOLUTION INTRAVENOUS; SUBCUTANEOUS AS NEEDED
Status: DISCONTINUED | OUTPATIENT
Start: 2018-05-29 | End: 2018-05-29 | Stop reason: HOSPADM

## 2018-05-29 RX ORDER — PROPOFOL 10 MG/ML
INJECTION, EMULSION INTRAVENOUS AS NEEDED
Status: DISCONTINUED | OUTPATIENT
Start: 2018-05-29 | End: 2018-05-29 | Stop reason: HOSPADM

## 2018-05-29 RX ORDER — TAMSULOSIN HYDROCHLORIDE 0.4 MG/1
0.4 CAPSULE ORAL
Status: DISCONTINUED | OUTPATIENT
Start: 2018-05-29 | End: 2018-05-31 | Stop reason: HOSPADM

## 2018-05-29 RX ORDER — LIDOCAINE HYDROCHLORIDE 20 MG/ML
INJECTION, SOLUTION EPIDURAL; INFILTRATION; INTRACAUDAL; PERINEURAL AS NEEDED
Status: DISCONTINUED | OUTPATIENT
Start: 2018-05-29 | End: 2018-05-29 | Stop reason: HOSPADM

## 2018-05-29 RX ORDER — ONDANSETRON 2 MG/ML
4 INJECTION INTRAMUSCULAR; INTRAVENOUS
Status: DISCONTINUED | OUTPATIENT
Start: 2018-05-29 | End: 2018-05-31 | Stop reason: HOSPADM

## 2018-05-29 RX ORDER — FENTANYL CITRATE 50 UG/ML
100 INJECTION, SOLUTION INTRAMUSCULAR; INTRAVENOUS ONCE
Status: DISCONTINUED | OUTPATIENT
Start: 2018-05-29 | End: 2018-05-29 | Stop reason: HOSPADM

## 2018-05-29 RX ORDER — ATENOLOL 50 MG/1
50 TABLET ORAL
Status: DISCONTINUED | OUTPATIENT
Start: 2018-05-30 | End: 2018-05-31 | Stop reason: HOSPADM

## 2018-05-29 RX ORDER — FAMOTIDINE 20 MG/1
20 TABLET, FILM COATED ORAL ONCE
Status: COMPLETED | OUTPATIENT
Start: 2018-05-29 | End: 2018-05-29

## 2018-05-29 RX ORDER — HEPARIN SODIUM 5000 [USP'U]/100ML
500 INJECTION, SOLUTION INTRAVENOUS CONTINUOUS
Status: DISCONTINUED | OUTPATIENT
Start: 2018-05-29 | End: 2018-05-31 | Stop reason: HOSPADM

## 2018-05-29 RX ORDER — CEFAZOLIN SODIUM/WATER 2 G/20 ML
2 SYRINGE (ML) INTRAVENOUS ONCE
Status: COMPLETED | OUTPATIENT
Start: 2018-05-29 | End: 2018-05-29

## 2018-05-29 RX ORDER — AMLODIPINE BESYLATE 10 MG/1
10 TABLET ORAL
Status: DISCONTINUED | OUTPATIENT
Start: 2018-05-30 | End: 2018-05-31 | Stop reason: HOSPADM

## 2018-05-29 RX ORDER — ROCURONIUM BROMIDE 10 MG/ML
INJECTION, SOLUTION INTRAVENOUS AS NEEDED
Status: DISCONTINUED | OUTPATIENT
Start: 2018-05-29 | End: 2018-05-29 | Stop reason: HOSPADM

## 2018-05-29 RX ORDER — SODIUM CHLORIDE, SODIUM LACTATE, POTASSIUM CHLORIDE, CALCIUM CHLORIDE 600; 310; 30; 20 MG/100ML; MG/100ML; MG/100ML; MG/100ML
75 INJECTION, SOLUTION INTRAVENOUS CONTINUOUS
Status: DISCONTINUED | OUTPATIENT
Start: 2018-05-29 | End: 2018-05-29 | Stop reason: HOSPADM

## 2018-05-29 RX ORDER — DEXTROSE MONOHYDRATE AND SODIUM CHLORIDE 5; .9 G/100ML; G/100ML
1000 INJECTION, SOLUTION INTRAVENOUS CONTINUOUS
Status: DISCONTINUED | OUTPATIENT
Start: 2018-05-29 | End: 2018-05-30

## 2018-05-29 RX ORDER — NALOXONE HYDROCHLORIDE 0.4 MG/ML
0.4 INJECTION, SOLUTION INTRAMUSCULAR; INTRAVENOUS; SUBCUTANEOUS AS NEEDED
Status: DISCONTINUED | OUTPATIENT
Start: 2018-05-29 | End: 2018-05-31 | Stop reason: HOSPADM

## 2018-05-29 RX ORDER — BUPIVACAINE HYDROCHLORIDE 2.5 MG/ML
INJECTION, SOLUTION EPIDURAL; INFILTRATION; INTRACAUDAL AS NEEDED
Status: DISCONTINUED | OUTPATIENT
Start: 2018-05-29 | End: 2018-05-29 | Stop reason: HOSPADM

## 2018-05-29 RX ORDER — OXYCODONE HYDROCHLORIDE 5 MG/1
10 TABLET ORAL
Status: DISCONTINUED | OUTPATIENT
Start: 2018-05-29 | End: 2018-05-29 | Stop reason: HOSPADM

## 2018-05-29 RX ORDER — SODIUM CHLORIDE 0.9 % (FLUSH) 0.9 %
5-10 SYRINGE (ML) INJECTION EVERY 8 HOURS
Status: DISCONTINUED | OUTPATIENT
Start: 2018-05-29 | End: 2018-05-31 | Stop reason: HOSPADM

## 2018-05-29 RX ORDER — OXYCODONE AND ACETAMINOPHEN 5; 325 MG/1; MG/1
1 TABLET ORAL
Status: DISCONTINUED | OUTPATIENT
Start: 2018-05-29 | End: 2018-05-31 | Stop reason: HOSPADM

## 2018-05-29 RX ORDER — ATORVASTATIN CALCIUM 40 MG/1
40 TABLET, FILM COATED ORAL
Status: DISCONTINUED | OUTPATIENT
Start: 2018-05-29 | End: 2018-05-31 | Stop reason: HOSPADM

## 2018-05-29 RX ORDER — PROTAMINE SULFATE 10 MG/ML
INJECTION, SOLUTION INTRAVENOUS AS NEEDED
Status: DISCONTINUED | OUTPATIENT
Start: 2018-05-29 | End: 2018-05-29 | Stop reason: HOSPADM

## 2018-05-29 RX ORDER — SODIUM CHLORIDE 0.9 % (FLUSH) 0.9 %
5-10 SYRINGE (ML) INJECTION AS NEEDED
Status: DISCONTINUED | OUTPATIENT
Start: 2018-05-29 | End: 2018-05-31 | Stop reason: HOSPADM

## 2018-05-29 RX ORDER — ONDANSETRON 2 MG/ML
INJECTION INTRAMUSCULAR; INTRAVENOUS AS NEEDED
Status: DISCONTINUED | OUTPATIENT
Start: 2018-05-29 | End: 2018-05-29 | Stop reason: HOSPADM

## 2018-05-29 RX ORDER — DIPHENHYDRAMINE HYDROCHLORIDE 50 MG/ML
12.5 INJECTION, SOLUTION INTRAMUSCULAR; INTRAVENOUS
Status: DISCONTINUED | OUTPATIENT
Start: 2018-05-29 | End: 2018-05-31 | Stop reason: HOSPADM

## 2018-05-29 RX ORDER — OXYCODONE HYDROCHLORIDE 5 MG/1
5 TABLET ORAL
Status: DISCONTINUED | OUTPATIENT
Start: 2018-05-29 | End: 2018-05-29 | Stop reason: HOSPADM

## 2018-05-29 RX ORDER — NEOSTIGMINE METHYLSULFATE 1 MG/ML
INJECTION INTRAVENOUS AS NEEDED
Status: DISCONTINUED | OUTPATIENT
Start: 2018-05-29 | End: 2018-05-29 | Stop reason: HOSPADM

## 2018-05-29 RX ORDER — ASPIRIN 81 MG/1
81 TABLET ORAL DAILY
Status: DISCONTINUED | OUTPATIENT
Start: 2018-05-30 | End: 2018-05-31 | Stop reason: HOSPADM

## 2018-05-29 RX ORDER — LISINOPRIL 5 MG/1
10 TABLET ORAL
Status: DISCONTINUED | OUTPATIENT
Start: 2018-05-30 | End: 2018-05-31 | Stop reason: HOSPADM

## 2018-05-29 RX ORDER — CEFAZOLIN SODIUM/WATER 2 G/20 ML
2 SYRINGE (ML) INTRAVENOUS EVERY 8 HOURS
Status: COMPLETED | OUTPATIENT
Start: 2018-05-29 | End: 2018-05-29

## 2018-05-29 RX ORDER — MIDAZOLAM HYDROCHLORIDE 1 MG/ML
2 INJECTION, SOLUTION INTRAMUSCULAR; INTRAVENOUS ONCE
Status: DISCONTINUED | OUTPATIENT
Start: 2018-05-29 | End: 2018-05-29 | Stop reason: HOSPADM

## 2018-05-29 RX ORDER — HYDROMORPHONE HYDROCHLORIDE 2 MG/ML
0.5 INJECTION, SOLUTION INTRAMUSCULAR; INTRAVENOUS; SUBCUTANEOUS
Status: DISCONTINUED | OUTPATIENT
Start: 2018-05-29 | End: 2018-05-29 | Stop reason: HOSPADM

## 2018-05-29 RX ORDER — MIDAZOLAM HYDROCHLORIDE 1 MG/ML
2 INJECTION, SOLUTION INTRAMUSCULAR; INTRAVENOUS ONCE
Status: COMPLETED | OUTPATIENT
Start: 2018-05-29 | End: 2018-05-29

## 2018-05-29 RX ORDER — HEPARIN SODIUM 5000 [USP'U]/ML
INJECTION, SOLUTION INTRAVENOUS; SUBCUTANEOUS AS NEEDED
Status: DISCONTINUED | OUTPATIENT
Start: 2018-05-29 | End: 2018-05-29 | Stop reason: HOSPADM

## 2018-05-29 RX ORDER — FENTANYL CITRATE 50 UG/ML
INJECTION, SOLUTION INTRAMUSCULAR; INTRAVENOUS AS NEEDED
Status: DISCONTINUED | OUTPATIENT
Start: 2018-05-29 | End: 2018-05-29 | Stop reason: HOSPADM

## 2018-05-29 RX ORDER — EPHEDRINE SULFATE 50 MG/ML
INJECTION, SOLUTION INTRAVENOUS AS NEEDED
Status: DISCONTINUED | OUTPATIENT
Start: 2018-05-29 | End: 2018-05-29 | Stop reason: HOSPADM

## 2018-05-29 RX ORDER — ACETAMINOPHEN 325 MG/1
650 TABLET ORAL
Status: DISCONTINUED | OUTPATIENT
Start: 2018-05-29 | End: 2018-05-31 | Stop reason: HOSPADM

## 2018-05-29 RX ORDER — MORPHINE SULFATE 10 MG/ML
5 INJECTION, SOLUTION INTRAMUSCULAR; INTRAVENOUS
Status: DISCONTINUED | OUTPATIENT
Start: 2018-05-29 | End: 2018-05-31 | Stop reason: HOSPADM

## 2018-05-29 RX ADMIN — Medication 10 ML: at 13:49

## 2018-05-29 RX ADMIN — EPHEDRINE SULFATE 10 MG: 50 INJECTION, SOLUTION INTRAVENOUS at 07:26

## 2018-05-29 RX ADMIN — Medication 2 G: at 22:57

## 2018-05-29 RX ADMIN — MORPHINE SULFATE 5 MG: 10 INJECTION INTRAMUSCULAR; INTRAVENOUS; SUBCUTANEOUS at 14:01

## 2018-05-29 RX ADMIN — FENTANYL CITRATE 50 MCG: 50 INJECTION, SOLUTION INTRAMUSCULAR; INTRAVENOUS at 08:04

## 2018-05-29 RX ADMIN — TAMSULOSIN HYDROCHLORIDE 0.4 MG: 0.4 CAPSULE ORAL at 20:20

## 2018-05-29 RX ADMIN — Medication 10 ML: at 20:18

## 2018-05-29 RX ADMIN — ROCURONIUM BROMIDE 10 MG: 10 INJECTION, SOLUTION INTRAVENOUS at 10:27

## 2018-05-29 RX ADMIN — PROTAMINE SULFATE 10 MG: 10 INJECTION, SOLUTION INTRAVENOUS at 10:39

## 2018-05-29 RX ADMIN — ONDANSETRON 4 MG: 2 INJECTION INTRAMUSCULAR; INTRAVENOUS at 19:28

## 2018-05-29 RX ADMIN — ATORVASTATIN CALCIUM 40 MG: 40 TABLET, FILM COATED ORAL at 20:21

## 2018-05-29 RX ADMIN — HEPARIN SODIUM 4000 UNITS: 1000 INJECTION, SOLUTION INTRAVENOUS; SUBCUTANEOUS at 09:51

## 2018-05-29 RX ADMIN — Medication 2 G: at 13:48

## 2018-05-29 RX ADMIN — ROCURONIUM BROMIDE 10 MG: 10 INJECTION, SOLUTION INTRAVENOUS at 09:02

## 2018-05-29 RX ADMIN — ROCURONIUM BROMIDE 50 MG: 10 INJECTION, SOLUTION INTRAVENOUS at 07:11

## 2018-05-29 RX ADMIN — GLYCOPYRROLATE 0.4 MG: 0.2 INJECTION INTRAMUSCULAR; INTRAVENOUS at 10:55

## 2018-05-29 RX ADMIN — Medication 2 G: at 07:40

## 2018-05-29 RX ADMIN — DEXTROSE MONOHYDRATE AND SODIUM CHLORIDE 1000 ML: 5; .9 INJECTION, SOLUTION INTRAVENOUS at 13:35

## 2018-05-29 RX ADMIN — HEPARIN SODIUM 8000 UNITS: 1000 INJECTION, SOLUTION INTRAVENOUS; SUBCUTANEOUS at 09:06

## 2018-05-29 RX ADMIN — HEPARIN SODIUM AND DEXTROSE 500 UNITS/HR: 5000; 5 INJECTION INTRAVENOUS at 18:36

## 2018-05-29 RX ADMIN — FAMOTIDINE 20 MG: 20 TABLET ORAL at 06:24

## 2018-05-29 RX ADMIN — EPHEDRINE SULFATE 10 MG: 50 INJECTION, SOLUTION INTRAVENOUS at 08:00

## 2018-05-29 RX ADMIN — EPHEDRINE SULFATE 10 MG: 50 INJECTION, SOLUTION INTRAVENOUS at 08:49

## 2018-05-29 RX ADMIN — ROCURONIUM BROMIDE 10 MG: 10 INJECTION, SOLUTION INTRAVENOUS at 09:43

## 2018-05-29 RX ADMIN — ONDANSETRON 4 MG: 2 INJECTION INTRAMUSCULAR; INTRAVENOUS at 10:49

## 2018-05-29 RX ADMIN — PROTAMINE SULFATE 10 MG: 10 INJECTION, SOLUTION INTRAVENOUS at 10:40

## 2018-05-29 RX ADMIN — SODIUM CHLORIDE, SODIUM LACTATE, POTASSIUM CHLORIDE, AND CALCIUM CHLORIDE: 600; 310; 30; 20 INJECTION, SOLUTION INTRAVENOUS at 10:55

## 2018-05-29 RX ADMIN — ROCURONIUM BROMIDE 20 MG: 10 INJECTION, SOLUTION INTRAVENOUS at 08:06

## 2018-05-29 RX ADMIN — OXYCODONE HYDROCHLORIDE AND ACETAMINOPHEN 1 TABLET: 5; 325 TABLET ORAL at 23:03

## 2018-05-29 RX ADMIN — PROPOFOL 20 MG: 10 INJECTION, EMULSION INTRAVENOUS at 10:49

## 2018-05-29 RX ADMIN — FENTANYL CITRATE 50 MCG: 50 INJECTION, SOLUTION INTRAMUSCULAR; INTRAVENOUS at 07:11

## 2018-05-29 RX ADMIN — MIDAZOLAM HYDROCHLORIDE 2 MG: 1 INJECTION, SOLUTION INTRAMUSCULAR; INTRAVENOUS at 07:00

## 2018-05-29 RX ADMIN — PROTAMINE SULFATE 10 MG: 10 INJECTION, SOLUTION INTRAVENOUS at 10:38

## 2018-05-29 RX ADMIN — EPHEDRINE SULFATE 10 MG: 50 INJECTION, SOLUTION INTRAVENOUS at 08:11

## 2018-05-29 RX ADMIN — PROTAMINE SULFATE 10 MG: 10 INJECTION, SOLUTION INTRAVENOUS at 10:37

## 2018-05-29 RX ADMIN — PROTAMINE SULFATE 10 MG: 10 INJECTION, SOLUTION INTRAVENOUS at 10:36

## 2018-05-29 RX ADMIN — GLYCOPYRROLATE 0.1 MG: 0.2 INJECTION INTRAMUSCULAR; INTRAVENOUS at 08:31

## 2018-05-29 RX ADMIN — NEOSTIGMINE METHYLSULFATE 3 MG: 1 INJECTION INTRAVENOUS at 10:55

## 2018-05-29 RX ADMIN — GLYCOPYRROLATE 0.1 MG: 0.2 INJECTION INTRAMUSCULAR; INTRAVENOUS at 08:43

## 2018-05-29 RX ADMIN — EPHEDRINE SULFATE 10 MG: 50 INJECTION, SOLUTION INTRAVENOUS at 07:51

## 2018-05-29 RX ADMIN — PROPOFOL 200 MG: 10 INJECTION, EMULSION INTRAVENOUS at 07:11

## 2018-05-29 RX ADMIN — EPHEDRINE SULFATE 10 MG: 50 INJECTION, SOLUTION INTRAVENOUS at 07:36

## 2018-05-29 RX ADMIN — SODIUM CHLORIDE, SODIUM LACTATE, POTASSIUM CHLORIDE, AND CALCIUM CHLORIDE 75 ML/HR: 600; 310; 30; 20 INJECTION, SOLUTION INTRAVENOUS at 06:24

## 2018-05-29 RX ADMIN — LIDOCAINE HYDROCHLORIDE 80 MG: 20 INJECTION, SOLUTION EPIDURAL; INFILTRATION; INTRACAUDAL; PERINEURAL at 07:11

## 2018-05-29 NOTE — ANESTHESIA POSTPROCEDURE EVALUATION
Post-Anesthesia Evaluation and Assessment    Patient: Tessie Arambula Sr. MRN: [id number]  SSN: [id number]    YOB: 1955  Age: 60 y.o. Sex: male       Cardiovascular Function/Vital Signs  Visit Vitals    /69    Pulse (!) 48    Temp 36.8 °C (98.3 °F)    Resp 18    Ht 5' 10\" (1.778 m)    Wt 83.9 kg (185 lb)    SpO2 100%    BMI 26.54 kg/m2       Patient is status post general anesthesia for Procedure(s):  right femoral popliteal bypass thrombectomy, proximal distal angiolasty, profundaplasty. Nausea/Vomiting: None    Postoperative hydration reviewed and adequate. Pain:  Pain Scale 1: Numeric (0 - 10) (05/29/18 1149)  Pain Intensity 1: 0 (05/29/18 1149)   Managed    Neurological Status:   Neuro (WDL): Exceptions to WDL (05/29/18 1149)  Neuro  Neurologic State: Alert (05/29/18 1149)  Orientation Level: Oriented X4 (05/29/18 1149)  Cognition: Appropriate decision making; Appropriate for age attention/concentration; Appropriate safety awareness; Follows commands (05/29/18 1149)  Speech: Clear (05/29/18 1149)  LUE Motor Response: Purposeful (05/29/18 1149)  LLE Motor Response: Purposeful (05/29/18 1149)  RUE Motor Response: Purposeful (05/29/18 1149)  RLE Motor Response: Purposeful (05/29/18 1149)   At baseline    Mental Status and Level of Consciousness: Arousable    Pulmonary Status:   O2 Device: Nasal cannula (05/29/18 1149)   Adequate oxygenation and airway patent    Complications related to anesthesia: None    Post-anesthesia assessment completed.  No concerns    Signed By: Ramesh Barnard MD     May 29, 2018

## 2018-05-29 NOTE — BRIEF OP NOTE
BRIEF OPERATIVE NOTE    Date of Procedure: 5/29/2018   Preoperative Diagnosis: Ischemia [I99.8]  Postoperative Diagnosis: Ischemia [I99.8]    Procedure(s):  right femoral popliteal bypass thrombectomy, proximal distal anastomatic angiolasty, profundaplasty w/patch  Surgeon(s) and Role:     * Dariela Cowan MD - Primary         Surgical Assistant:     Surgical Staff:  Circ-1: Zana Hernandez RN  Circ-Relief: Maria Del Carmen Ko RN  Scrub Tech-1: Aniya Lynn  Scrub Tech-2: Sanjuana Hernandez  Event Time In   Incision Start 0800   Incision Close 1110     Anesthesia: General   Estimated Blood Loss: 150cc  Specimens: * No specimens in log *   Findings:    Complications: None  Implants:   Implant Name Type Inv.  Item Serial No.  Lot No. LRB No. Used Action   [facility] VASC PERIPATCH 0.8X8CM Lina Gonzales - [id number]  [address] 0.8X8CM -- [employer] [id number] Right 1 Implanted   [facility] VASC PERIPATCH 0.8X8CM -- Vijaya Picket   PATCH VASC PERIPATCH 0.8X8CM -- [employer] [id number] Right 1 Implanted

## 2018-05-29 NOTE — PROGRESS NOTES
TRANSFER - IN REPORT:    Verbal report received from [id number] Goodman on Dulce Fraire Sr.  being received from PACU for routine progression of care      Report consisted of patients Situation, Background, Assessment and   Recommendations(SBAR). Information from the following report(s) SBAR, Kardex, OR Summary, Intake/Output, MAR and Recent Results was reviewed with the receiving nurse. Opportunity for questions and clarification was provided. Assessment completed upon patients arrival to unit and care assumed. Dual skin assessment completed with RN. No redness or skin breakdown on sacrum/coccyx area noted.

## 2018-05-29 NOTE — CONSULTS
[facility]  MR#: [id number]  : 1955  ACCOUNT #: [de-identified]   DATE OF SERVICE: 2018    CONSULTING DOCTOR:  Dr. Jacinto Hernández. REASON FOR CONSULTATION:  Inability to place Camp catheter. HISTORY:  The patient is undergoing vascular procedure and the staff cannot place a Camp catheter. I was asked to see the patient. PHYSICAL EXAM:  I attempted to pass a 12 Western Cherelle coude catheter. There is resistance in the distal urethra consistent with a distal urethral stricture. PROCEDURE:  A 16 Guamanian Mixify sound was used to pass through the stricture. I could feel a definite give as the sound dilated the stricture. I was then able to pass the coude catheter successfully into the bladder with clear urine. The balloon was inflated with 10 mL of water. ASSESSMENT:  Distal urethral stricture, currently dilated. RECOMMENDATIONS:  May remove catheter as indicated.       MD Allison Storm / Lon Sanchez  D: 2018 08:12     T: 2018 08:31  JOB #: [id number]

## 2018-05-29 NOTE — PERIOP NOTES
TRANSFER - OUT REPORT:    Verbal report given to Zarina on Sushil Baca Sr.  being transferred to 2225(unit) for routine post - op       Report consisted of patients Situation, Background, Assessment and   Recommendations(SBAR). Information from the following report(s) SBAR, Procedure Summary, Intake/Output, MAR and Cardiac Rhythm NSR\ was reviewed with the receiving nurse. Lines:   Peripheral IV 05/29/18 Left Hand (Active)   Site Assessment Clean, dry, & intact 5/29/2018 11:49 AM   Phlebitis Assessment 0 5/29/2018 11:49 AM   Infiltration Assessment 0 5/29/2018 11:49 AM   Dressing Status Clean, dry, & intact 5/29/2018 11:49 AM   Dressing Type Transparent 5/29/2018 11:49 AM   Hub Color/Line Status Infusing 5/29/2018 11:49 AM   Alcohol Cap Used No 5/29/2018 11:49 AM       Peripheral IV 05/29/18 Right Forearm (Active)   Site Assessment Clean, dry, & intact 5/29/2018 11:49 AM   Phlebitis Assessment 0 5/29/2018 11:49 AM   Infiltration Assessment 0 5/29/2018 11:49 AM   Dressing Status Clean, dry, & intact 5/29/2018 11:49 AM   Dressing Type Transparent 5/29/2018 11:49 AM   Hub Color/Line Status Capped 5/29/2018 11:49 AM   Alcohol Cap Used No 5/29/2018 11:49 AM       Arterial Line 05/29/18 Left Radial artery (Active)   Site Assessment Clean, dry, & intact 5/29/2018 11:49 AM   Dressing Status Clean, dry, & intact 5/29/2018 11:49 AM   Dressing Type Transparent;Tape 5/29/2018 11:49 AM   Line Status Intact and in place 5/29/2018 11:49 AM   Treatment Zeroed or re-zeroed 5/29/2018 11:19 AM   Affected Extremity/Extremities Color distal to insertion site pink (or appropriate for race) 5/29/2018 11:49 AM        Opportunity for questions and clarification was provided. Patient transported with:   O2 @ 2 liters. MONITORS, AND NURSE    VTE prophylaxis orders have been written for Angela Renteria. .    Patient and family given floor number and nurses name.   Family updated re: pt status after security code verified. \

## 2018-05-29 NOTE — IP AVS SNAPSHOT
[address] 
[phone] Patient: Joe Epstein Sr. MRN: [id number] MIS:6/69/2104 A check juan indicates which time of day the medication should be taken. My Medications START taking these medications Instructions Each Dose to Equal  
 Morning Noon Evening Bedtime  
 aspirin delayed-release 81 mg tablet Start taking on:  6/1/2018 Your last dose was: Your next dose is: Take 1 Tab by mouth daily. 81 mg  
    
   
   
   
  
 oxyCODONE-acetaminophen 5-325 mg per tablet Commonly known as:  PERCOCET Your last dose was: Your next dose is: Take 1 Tab by mouth every six (6) hours as needed. Max Daily Amount: 4 Tabs. 1 Tab CONTINUE taking these medications Instructions Each Dose to Equal  
 Morning Noon Evening Bedtime  
 amLODIPine 10 mg tablet Commonly known as:  Sundra [place] Your last dose was: Your next dose is: Take 10 mg by mouth every morning. 10 mg  
    
   
   
   
  
 atenolol 50 mg tablet Commonly known as:  TENORMIN Your last dose was: Your next dose is: Take 50 mg by mouth every morning. 50 mg  
    
   
   
   
  
 atorvastatin 40 mg tablet Commonly known as:  LIPITOR Your last dose was: Your next dose is: Take 40 mg by mouth nightly. 40 mg  
    
   
   
   
  
 ferrous sulfate 325 mg (65 mg iron) tablet Your last dose was: Your next dose is: Take  by mouth Daily (before breakfast). lisinopril 10 mg tablet Commonly known as:  Pecola Cypher Your last dose was: Your next dose is: Take 10 mg by mouth every morning. 10 mg  
    
   
   
   
  
 PLAVIX 75 mg Tab Generic drug:  clopidogrel Your last dose was: Your next dose is: Take 75 mg by mouth every morning. Indications: last dose 5/20/18  
 75 mg  
    
   
   
   
  
 tamsulosin 0.4 mg capsule Commonly known as:  FLOMAX Your last dose was: Your next dose is: Take 0.4 mg by mouth nightly. 0.4 mg Where to Get Your Medications Information on where to get these meds will be given to you by the nurse or doctor. ! Ask your nurse or doctor about these medications  
  aspirin delayed-release 81 mg tablet  
 oxyCODONE-acetaminophen 5-325 mg per tablet

## 2018-05-29 NOTE — PROGRESS NOTES
Called Daja Pratt to inquire about pulling art line before starting heparin drip. New orders received.  Pull art line I L Radial.

## 2018-05-29 NOTE — PROGRESS NOTES
Pulled Lradial art line. Held pressure for 10min to achieve hemostasis. Site stable no hematoma no bleeding. Started heparin drip. Patient stable. Eloise Noble aware of patient's HR as low as 42.

## 2018-05-29 NOTE — IP AVS SNAPSHOT
[address] [facility] 
 
 
 [address] [facility] [address] [facility] 
[phone] Patient: Martín Loredo Sr. MRN: [id number] SML:9/89/[id number] About your hospitalization You were admitted on:  May 29, 2018 You last received care in the:  [facility] 2 CV STEPDOWN You were discharged on:  May 31, 2018 Why you were hospitalized Your primary diagnosis was:  Ischemia Of Right Lower Extremity Your diagnoses also included:  Ischemia Follow-up Information Follow up With Details Comments Contact Info [address]  They will call you within 24 hours to schedule home health visit. [address] [place] [address] 
[phone] Ye Shaw MD   [address] [facility] Internal Medicine an 
[place] [address] 
[phone] Kristian Odom MD Go on 6/14/2018 at 2:15 PM for surgery [address] [facility] [address] 
[phone] Your Scheduled Appointments Thursday June 14, 2018  2:15 PM EDT Global Post Op with Kristian Odom MD  
[facility] VASCULAR SURGERY ([facility]) [address] [address] [phone] Discharge Orders None A check juan indicates which time of day the medication should be taken. My Medications START taking these medications Instructions Each Dose to Equal  
 Morning Noon Evening Bedtime  
 aspirin delayed-release 81 mg tablet Start taking on:  6/1/2018 Your last dose was: Your next dose is: Take 1 Tab by mouth daily. 81 mg  
    
   
   
   
  
 oxyCODONE-acetaminophen 5-325 mg per tablet Commonly known as:  PERCOCET Your last dose was: Your next dose is: Take 1 Tab by mouth every six (6) hours as needed. Max Daily Amount: 4 Tabs. 1 Tab CONTINUE taking these medications Instructions Each Dose to Equal  
 Morning Noon Evening Bedtime  
 amLODIPine 10 mg tablet Commonly known as:  Hitesh Apangeline Your last dose was: Your next dose is: Take 10 mg by mouth every morning. 10 mg  
    
   
   
   
  
 atenolol 50 mg tablet Commonly known as:  TENORMIN Your last dose was: Your next dose is: Take 50 mg by mouth every morning. 50 mg  
    
   
   
   
  
 atorvastatin 40 mg tablet Commonly known as:  LIPITOR Your last dose was: Your next dose is: Take 40 mg by mouth nightly. 40 mg  
    
   
   
   
  
 ferrous sulfate 325 mg (65 mg iron) tablet Your last dose was: Your next dose is: Take  by mouth Daily (before breakfast). lisinopril 10 mg tablet Commonly known as:  Karn Desanctis Your last dose was: Your next dose is: Take 10 mg by mouth every morning. 10 mg  
    
   
   
   
  
 PLAVIX 75 mg Tab Generic drug:  clopidogrel Your last dose was: Your next dose is: Take 75 mg by mouth every morning. Indications: last dose 5/20/18  
 75 mg  
    
   
   
   
  
 tamsulosin 0.4 mg capsule Commonly known as:  FLOMAX Your last dose was: Your next dose is: Take 0.4 mg by mouth nightly. 0.4 mg Where to Get Your Medications Information on where to get these meds will be given to you by the nurse or doctor. ! Ask your nurse or doctor about these medications  
  aspirin delayed-release 81 mg tablet  
 oxyCODONE-acetaminophen 5-325 mg per tablet Opioid Education Prescription Opioids: What You Need to Know: 
 
Prescription opioids can be used to help relieve moderate-to-severe pain and are often prescribed following a surgery or injury, or for certain health conditions.   These medications can be an important part of treatment but also come with serious risks. Opioids are strong pain medicines. Examples include hydrocodone, oxycodone, fentanyl, and morphine. Heroin is an example of an illegal opioid. It is important to work with your health care provider to make sure you are getting the safest, most effective care. WHAT ARE THE RISKS AND SIDE EFFECTS OF OPIOID USE? Prescription opioids carry serious risks of addiction and overdose, especially with prolonged use. An opioid overdose, often marked by slow breathing, can cause sudden death. The use of prescription opioids can have a number of side effects as well, even when taken as directed. · Tolerance-meaning you might need to take more of a medication for the same pain relief · Physical dependence-meaning you have symptoms of withdrawal when the medication is stopped. Withdrawal symptoms can include nausea, sweating, chills, diarrhea, stomach cramps, and muscle aches. Withdrawal can last up to several weeks, depending on which drug you took and how long you took it. · Increased sensitivity to pain · Constipation · Nausea, vomiting, and dry mouth · Sleepiness and dizziness · Confusion · Depression · Low levels of testosterone that can result in lower sex drive, energy, and strength · Itching and sweating RISKS ARE GREATER WITH:      
· History of drug misuse, substance use disorder, or overdose · Mental health conditions (such as depression or anxiety) · Sleep apnea · Older age (72 years or older) · Pregnancy Avoid alcohol while taking prescription opioids. Also, unless specifically advised by your health care provider, medications to avoid include: · Benzodiazepines (such as Xanax or Valium) · Muscle relaxants (such as Soma or Flexeril) · Hypnotics (such as Ambien or Lunesta) · Other prescription opioids KNOW YOUR OPTIONS Talk to your health care provider about ways to manage your pain that don't involve prescription opioids. Some of these options may actually work better and have fewer risks and side effects. Options may include: 
· Pain relievers such as acetaminophen, ibuprofen, and naproxen · Some medications that are also used for depression or seizures · Physical therapy and exercise · Counseling to help patients learn how to cope better with triggers of pain and stress. · Application of heat or cold compress · Massage therapy · Relaxation techniques Be Informed Make sure you know the name of your medication, how much and how often to take it, and its potential risks & side effects. IF YOU ARE PRESCRIBED OPIOIDS FOR PAIN: 
· Never take opioids in greater amounts or more often than prescribed. Remember the goal is not to be pain-free but to manage your pain at a tolerable level. · Follow up with your primary care provider to: · Work together to create a plan on how to manage your pain. · Talk about ways to help manage your pain that don't involve prescription opioids. · Talk about any and all concerns and side effects. · Help prevent misuse and abuse. · Never sell or share prescription opioids · Help prevent misuse and abuse. · Store prescription opioids in a secure place and out of reach of others (this may include visitors, children, friends, and family). · Safely dispose of unused/unwanted prescription opioids: Find your community drug take-back program or your pharmacy mail-back program, or flush them down the toilet, following guidance from the Food and Drug Administration (www.fda.gov/Drugs/ResourcesForYou). · Visit www.cdc.gov/drugoverdose to learn about the risks of opioid abuse and overdose. · If you believe you may be struggling with addiction, tell your health care provider and ask for guidance or call [address] [place], [place] [address] at [phone]. Discharge Instructions MD Instructions: 
Wound care: 
- Wash leg and groin wounds daily with liquid Dial soap (or other liquid antibacterial soap); use fingers or soft washcloth gently then pat area dry. - Very important: keep incision / staples clean and dry; cover groin incision with gauze. - Do not apply lotions, creams or ointments to incisions. 
- Skin glue will fall off on its own. Diet: - As tolerated before surgery. Activity: 
- Don't overdo your activity throughout the day for the next 10-14 days - no prolonged standing, no lifting more than 8lb. - Keep legs propped up when not walking. 
- No driving while taking narcotics. - Do not drink alcohol while taking narcotics. - May shower - no tub baths, soaking or swimming. Medications: - Use prescription pain medications if needed; if you do not wish to use narcotic pain medication or require less pain control, you may take acetaminophen (Tylenol, for example) or naproxen (Aleve, for example) following instructions on the label. 
- Resume other home medications, including aspirin and Plavix (clopidogrel). 
  
Follow up in the office with Dr. Lizbeth Taylor / nurse practitioner Paula Barr on 6/14/2018 at 2:15 PM. If problems or questions arise, please call our office at [phone]. DISCHARGE SUMMARY from Nurse PATIENT INSTRUCTIONS: 
 
 
F-face looks uneven A-arms unable to move or move unevenly S-speech slurred or non-existent T-time-call 911 as soon as signs and symptoms begin-DO NOT go Back to bed or wait to see if you get better-TIME IS BRAIN. Warning Signs of HEART ATTACK Call 911 if you have these symptoms: 
? Chest discomfort. Most heart attacks involve discomfort in the center of the chest that lasts more than a few minutes, or that goes away and comes back. It can feel like uncomfortable pressure, squeezing, fullness, or pain. ? Discomfort in other areas of the upper body. Symptoms can include pain or discomfort in one or both arms, the back, neck, jaw, or stomach. ? Shortness of breath with or without chest discomfort. ? Other signs may include breaking out in a cold sweat, nausea, or lightheadedness. Don't wait more than five minutes to call [address]! Fast action can save your life. Calling 911 is almost always the fastest way to get lifesaving treatment. Emergency Medical Services staff can begin treatment when they arrive  up to an hour sooner than if someone gets to the hospital by car. The discharge information has been reviewed with the patient and spouse. The patient and spouse verbalized understanding. Discharge medications reviewed with the patient and spouse and appropriate educational materials and side effects teaching were provided. ___________________________________________________________________________________________________________________________________ [facility] Announcement We are excited to announce that we are making your provider's discharge notes available to you in Localmint. You will see these notes when they are completed and signed by the physician that discharged you from your recent hospital stay. If you have any questions or concerns about any information you see in [facility], please call the Health Information Department where you were seen or reach out to your Primary Care Provider for more information about your plan of care. Introducing hospitals & HEALTH SERVICES! [facility] introduces Localmint patient portal. Now you can access parts of your medical record, email your doctor's office, and request medication refills online. 1. In your internet browser, go to [email or web address] 2. Click on the First Time User? Click Here link in the Sign In box. You will see the New Member Sign Up page. 3. Enter your [email or web address] Access Code exactly as it appears below. You will not need to use this code after youve completed the sign-up process. If you do not sign up before the expiration date, you must request a new code. · [email or web address] Access Code: [email or web address] Expires: 7/18/2018  2:50 PM 
 
4. Enter the last four digits of your Social Security Number (xxxx) and Date of Birth (mm/dd/yyyy) as indicated and click Submit. You will be taken to the next sign-up page. 5. Create a Next Big Soundt ID. This will be your [email or web address] login ID and cannot be changed, so think of one that is secure and easy to remember. 6. Create a [email or web address] password. You can change your password at any time. 7. Enter your Password Reset Question and Answer. This can be used at a later time if you forget your password. 8. Enter your e-mail address. You will receive e-mail notification when new information is available in [address] 9. Click Sign Up. You can now view and download portions of your medical record. 10. Click the Download Summary menu link to download a portable copy of your medical information. If you have questions, please visit the Frequently Asked Questions section of the [email or web address] website. Remember, [email or web address] is NOT to be used for urgent needs. For medical emergencies, dial 911. Now available from your iPhone and Android! Introducing Gaetano Thompson As a [facility] patient, I wanted to make you aware of our electronic visit tool called Gaetano Matiaserick. [facility] 24/7 allows you to connect within minutes with a medical provider 24 hours a day, seven days a week via a mobile device or tablet or logging into a secure website from your computer. You can access Gaetano Salcedojoslynfin from anywhere in the [place].  
 
A virtual visit might be right for you when you have a simple condition and feel like you just dont want to get out of bed, or cant get away from work for an appointment, when your regular [facility] provider is not available (evenings, weekends or holidays), or when youre out of town and need minor care. Electronic visits cost only $49 and if the [facility] 24/7 provider determines a prescription is needed to treat your condition, one can be electronically transmitted to a nearby pharmacy*. Please take a moment to enroll today if you have not already done so. The enrollment process is free and takes just a few minutes. To enroll, please download the [employer]" rafael to your tablet or phone, or visit [email or web address] to enroll on your computer. And, as an [address] [place] [address] patient with a [employer] account, the results of your visits will be scanned into your electronic medical record and your primary care provider will be able to view the scanned results. We urge you to continue to see your regular [facility] provider for your ongoing medical care. And while your primary care provider may not be the one available when you seek a [employer]LBRjoslyn[employer] virtual visit, the peace of mind you get from getting a real diagnosis real time can be priceless. For more information on SkyPhrase, view our Frequently Asked Questions (FAQs) at [email or web address]. Sincerely, 
 
Carla Galindo MD 
Chief Medical Officer [employer] *:  certain medications cannot be prescribed via SkyPhrase Unresulted Labs-Please follow up with your PCP about these lab tests Order Current Status NC XR TECHNOLOGIST SERVICE In process Providers Seen During Your Hospitalization Provider Specialty Primary office phone Jennifer Sanches MD Vascular Surgery [phone] Your Primary Care Physician (PCP) Primary Care Physician Office Phone Office Fax Rajan Eckert [phone] You are allergic to the following Allergen Reactions Other Food Anaphylaxis Any fresh fruit from trees Processed/cooked fruit is tolerable Shellfish Derived Anaphylaxis Recent Documentation Height Weight BMI Smoking Status 1.778 m 87.4 kg 27.65 kg/m2 Former Smoker Emergency Contacts Name Discharge Info Relation Home Work Mobile Judith Guzman DISCHARGE CAREGIVER [3] Spouse [3] [phone] Patient Belongings The following personal items are in your possession at time of discharge: 
  Dental Appliances: Lowers, Uppers  Visual Aid: Glasses   Hearing Aids/Status: At home  Home Medications: None   Jewelry: None  Clothing: Footwear, Pants, Shirt    Other Valuables: Cell Phone  Personal Items Sent to Safe: n/a Discharge Instructions Attachments/References FEMOROPOPLITEAL BYPASS: POST-OP (ENGLISH) HEART: GENERAL INFO (ENGLISH) HEALTHY DIET: HEART (ENGLISH) HEART ATTACK: MEDICINE TO REDUCE RISK (ENGLISH) CLOPIDOGREL (BY MOUTH) (ENGLISH) SMOKING: STOPPING (ENGLISH) SECONDHAND SMOKE (ENGLISH) ELECTRONIC CIGARETTES: GENERAL INFO (ENGLISH) Patient Handouts Femoropopliteal Bypass: What to Expect at [facility] Your Recovery You will have some pain from the cuts (incisions) the doctor made. This usually gets better after about 1 week. Your doctor will give you pain medicine for this. You can expect your leg to be swollen at first. This is a normal part of recovery and may last 2 or 3 months. You will have stitches or staples in the incisions. If you have stitches, they may dissolve on their own. Or your doctor may take them out 7 to 14 days after your surgery. You will need to take it easy for 2 to 6 weeks at home. It may take 6 to 12 weeks to fully recover. After surgery, blood may flow better throughout your leg, which can decrease leg pain, numbness, and cramping. You will need to have regular checkups with your doctor to make sure the graft is working. This care sheet gives you a general idea about how long it will take for you to recover. But each person recovers at a different pace. Follow the steps below to get better as quickly as possible. How can you care for yourself at home? Activity ? · Rest when you feel tired. Getting enough sleep will help you recover. ? · Try to walk each day or as often as your doctor tells you. Start by walking a little more than you did the day before. Bit by bit, increase the amount you walk. Walking boosts blood flow and helps prevent pneumonia and constipation. ? · Avoid strenuous activities, such as bicycle riding, jogging, weight lifting, or aerobic exercise, until your doctor says it is okay. ? · Ask your doctor when you can drive again. ? · If you work, you will probably need to take 2 to 6 weeks off, depending on your job. ? · You may shower, if your doctor says it is okay. Do not take a bath for the first 2 weeks, or until your doctor tells you it is okay. Diet ? · You can eat your normal diet. If your stomach is upset, try bland, low-fat foods like plain rice, broiled chicken, toast, and yogurt. ? · Drink plenty of fluids (unless your doctor tells you not to). ? · You may notice that your bowel movements are not regular right after your surgery. This is common. You may want to take a fiber supplement every day. If you have not had a bowel movement after a couple of days, ask your doctor about taking a mild laxative. Medicines ? · Your doctor will tell you if and when you can restart your medicines. He or she will also give you instructions about taking any new medicines. ? · If you take blood thinners, such as warfarin (Coumadin), clopidogrel (Plavix), or aspirin, be sure to talk to your doctor. He or she will tell you if and when to start taking those medicines again. Make sure that you understand exactly what your doctor wants you to do. ? · Be safe with medicines. Take your medicines exactly as prescribed. Call your doctor if you think you are having a problem with your medicine. ? · Take pain medicines exactly as directed. ¨ If the doctor gave you a prescription medicine for pain, take it as prescribed. ¨ If you are not taking a prescription pain medicine, ask your doctor if you can take an over-the-counter medicine. ? · If you think your pain medicine is making you sick to your stomach: 
¨ Take your medicine after meals (unless your doctor has told you not to). ¨ Ask your doctor for a different pain medicine. ? · If your doctor prescribed antibiotics, take them as directed. Do not stop taking them just because you feel better. You need to take the full course of antibiotics. ? · Your doctor may prescribe a blood thinner when you go home. This helps prevent blood clots. Be sure you get instructions about how to take your medicine safely. Blood thinners can cause serious bleeding problems. Incision care ? · If you have bandages on the incisions, follow your doctor's instructions about changing them. ? · If you have strips of tape on the incisions, leave the tape on for a week or until it falls off.  
? · Wash the area daily with warm, soapy water, and pat it dry. Don't use hydrogen peroxide or alcohol, which can slow healing. You may cover the area with a gauze bandage if it weeps or rubs against clothing. Change the bandage every day. ? · Keep the area clean and dry. ? Elevation ? · Prop up your leg on a pillow anytime you sit or lie down for the first 3 days. Try to keep it above the level of your heart. This will help reduce swelling. Follow-up care is a key part of your treatment and safety. Be sure to make and go to all appointments, and call your doctor if you are having problems. It's also a good idea to know your test results and keep a list of the medicines you take. When should you call for help? Call 911 anytime you think you may need emergency care. For example, call if: 
? · You passed out (lost consciousness). ? · You have trouble breathing. ?Call your doctor now or seek immediate medical care if: 
? · You have severe pain in your leg, or it becomes cold, pale, blue, tingly, or numb. ? · You have pain that does not get better after you take pain medicine. ? · You have loose stitches, or your incisions come open. ? · You are bleeding a lot from the incisions. ? · You have signs of infection, such as: 
¨ Increased pain, swelling, warmth, or redness. ¨ Red streaks leading from the incision. ¨ Pus draining from the incision. ¨ A fever. ? · You are sick to your stomach or cannot keep fluids down. ? Watch closely for any changes in your health, and be sure to contact your doctor if: 
? · You do not get better as expected. Where can you learn more? Go to http://caleb-taylor.info/. Enter K768 in the search box to learn more about \"Femoropopliteal Bypass: What to Expect at Home. \" Current as of: September 21, 2016 Content Version: 11.4 © 3063-9267 [employer]. Care instructions adapted under license by [employer] (which disclaims liability or warranty for this information). If you have questions about a medical condition or this instruction, always ask your healthcare professional. [place] [address] any warranty or liability for your use of this information. A Healthy Heart: Care Instructions Your Care Instructions Heart disease occurs when a substance called plaque builds up in the vessels that supply oxygen-rich blood to your heart. This can narrow the blood vessels and reduce blood flow. A heart attack happens when blood flow is completely blocked. A high-fat diet, smoking, and other factors increase the risk of heart disease. Your doctor has found that you have a chance of having heart disease.  You can do lots of things to keep your heart healthy. It may not be easy, but you can change your diet, exercise more, and quit smoking. These steps really work to lower your chance of heart disease. Follow-up care is a key part of your treatment and safety. Be sure to make and go to all appointments, and call your doctor if you are having problems. It's also a good idea to know your test results and keep a list of the medicines you take. How can you care for yourself at home? Diet ? · Use less salt when you cook and eat. This helps lower your blood pressure. Taste food before salting. Add only a little salt when you think you need it. With time, your taste buds will adjust to less salt. ? · Eat fewer snack items, fast foods, canned soups, and other high-salt, high-fat, processed foods. ? · Read food labels and try to avoid saturated and trans fats. They increase your risk of heart disease by raising cholesterol levels. ? · Limit the amount of solid fat-butter, margarine, and shortening-you eat. Use olive, peanut, or canola oil when you cook. Bake, broil, and steam foods instead of frying them. ? · Eating fish can lower your risk for heart disease. Eat at least 2 servings of fish a week. [place], mackerel, herring, sardines, and chunk light tuna are very good choices. These fish contain omega-3 fatty acids. ? · Eat a variety of fruit and vegetables every day. Dark green, deep orange, red, or yellow fruits and vegetables are especially good for you. Examples include spinach, carrots, peaches, and berries. ? · Foods high in fiber can reduce your cholesterol and provide important vitamins and minerals. High-fiber foods include whole-grain cereals and breads, oatmeal, beans, brown rice, citrus fruits, and apples. ? · Limit drinks and foods with added sugar. These include candy, desserts, and soda pop. ? Lifestyle changes ? · If your doctor recommends it, get more exercise.  Walking is a good choice. Bit by bit, increase the amount you walk every day. Try for at least 30 minutes on most days of the week. You also may want to swim, bike, or do other activities. ? · Do not smoke. If you need help quitting, talk to your doctor about stop-smoking programs and medicines. These can increase your chances of quitting for good. Quitting smoking may be the most important step you can take to protect your heart. It is never too late to quit. You will get health benefits right away. ? · Limit alcohol to 2 drinks a day for men and 1 drink a day for women. Too much alcohol can cause health problems. Medicines ? · Take your medicines exactly as prescribed. Call your doctor if you think you are having a problem with your medicine. ? · If your doctor recommends aspirin, take the amount directed each day. Make sure you take aspirin and not another kind of pain reliever, such as acetaminophen (Tylenol). If you take ibuprofen (such as Advil or Motrin) for other problems, take aspirin at least 2 hours before taking ibuprofen. When should you call for help? Call 911 if you have symptoms of a heart attack. These may include: 
? · Chest pain or pressure, or a strange feeling in the chest.  
? · Sweating. ? · Shortness of breath. ? · Pain, pressure, or a strange feeling in the back, neck, jaw, or upper belly or in one or both shoulders or arms. ? · Lightheadedness or sudden weakness. ? · A fast or irregular heartbeat. ? After you call 911, the  may tell you to chew 1 adult-strength or 2 to 4 low-dose aspirin. Wait for an ambulance. Do not try to drive yourself. ? Watch closely for changes in your health, and be sure to contact your doctor if you have any problems. Where can you learn more? Go to http://caleb-atylor.info/. Enter J266 in the search box to learn more about \"A Healthy Heart: Care Instructions. \" Current as of: September 21, 2016 Content Version: 11.4 © 0356-9135 [employer]. Care instructions adapted under license by [employer] (which disclaims liability or warranty for this information). If you have questions about a medical condition or this instruction, always ask your healthcare professional. [address] any warranty or liability for your use of this information. Heart-Healthy Diet: Care Instructions Your Care Instructions A heart-healthy diet has lots of vegetables, fruits, nuts, beans, and whole grains, and is low in salt. It limits foods that are high in saturated fat, such as meats, cheeses, and fried foods. It may be hard to change your diet, but even small changes can lower your risk of heart attack and heart disease. Follow-up care is a key part of your treatment and safety. Be sure to make and go to all appointments, and call your doctor if you are having problems. It's also a good idea to know your test results and keep a list of the medicines you take. How can you care for yourself at home? Watch your portions · Learn what a serving is. A \"serving\" and a \"portion\" are not always the same thing. Make sure that you are not eating larger portions than are recommended. For example, a serving of pasta is ½ cup. A serving size of meat is 2 to 3 ounces. A 3-ounce serving is about the size of a deck of cards. Measure serving sizes until you are good at [place]" them. Keep in mind that restaurants often serve portions that are 2 or 3 times the size of one serving. · To keep your energy level up and keep you from feeling hungry, eat often but in smaller portions. · Eat only the number of calories you need to stay at a healthy weight. If you need to lose weight, eat fewer calories than your body burns (through exercise and other physical activity). Eat more fruits and vegetables · Eat a variety of fruit and vegetables every day.  Dark green, deep orange, red, or yellow fruits and vegetables are especially good for you. Examples include spinach, carrots, peaches, and berries. · Keep carrots, celery, and other veggies handy for snacks. Buy fruit that is in season and store it where you can see it so that you will be tempted to eat it. · Cook dishes that have a lot of veggies in them, such as stir-fries and soups. Limit saturated and trans fat · Read food labels, and try to avoid saturated and trans fats. They increase your risk of heart disease. Trans fat is found in many processed foods such as cookies and crackers. · Use olive or canola oil when you cook. Try cholesterol-lowering spreads, such as Benecol or Take Control. · Bake, broil, grill, or steam foods instead of frying them. · Choose lean meats instead of high-fat meats such as hot dogs and sausages. Cut off all visible fat when you prepare meat. · Eat fish, skinless poultry, and meat alternatives such as soy products instead of high-fat meats. Soy products, such as tofu, may be especially good for your heart. · Choose low-fat or fat-free milk and dairy products. Eat fish · Eat at least two servings of fish a week. Certain fish, such as salmon and tuna, contain omega-3 fatty acids, which may help reduce your risk of heart attack. Eat foods high in fiber · Eat a variety of grain products every day. Include whole-grain foods that have lots of fiber and nutrients. Examples of whole-grain foods include oats, whole wheat bread, and brown rice. · Buy whole-grain breads and cereals, instead of white bread or pastries. Limit salt and sodium · Limit how much salt and sodium you eat to help lower your blood pressure. · Taste food before you salt it. Add only a little salt when you think you need it. With time, your taste buds will adjust to less salt. · Eat fewer snack items, fast foods, and other high-salt, processed foods. Check food labels for the amount of sodium in packaged foods. · Choose low-sodium versions of canned goods (such as soups, vegetables, and beans). Limit sugar · Limit drinks and foods with added sugar. These include candy, desserts, and soda pop. Limit alcohol · Limit alcohol to no more than 2 drinks a day for men and 1 drink a day for women. Too much alcohol can cause health problems. When should you call for help? Watch closely for changes in your health, and be sure to contact your doctor if: 
? · You would like help planning heart-healthy meals. Where can you learn more? Go to http://calebBroadcast Pixtaylor.info/. Enter V137 in the search box to learn more about \"Heart-Healthy Diet: Care Instructions. \" Current as of: September 21, 2016 Content Version: 11.4 © 1764-4929 [employer]. Care instructions adapted under license by [employer] (which disclaims liability or warranty for this information). If you have questions about a medical condition or this instruction, always ask your healthcare professional. [place] [address] any warranty or liability for your use of this information. Reducing Heart Attack Risk With Daily Medicine: Care Instructions Your Care Instructions Heart disease is the number one cause of death. If you are at risk for heart disease, there are many medicines that can reduce your risk. These include: · ACE inhibitors. These are a type of blood pressure medicine. They can reduce the risk of heart attacks and strokes if you are at high risk. · Statin medicines. These lower cholesterol. They can also reduce the risk of heart disease and strokes. · Aspirin. It can help certain people lower their risk of a heart attack or stroke. · Beta-blocker medicines. These are a type of blood pressure and heart medicine. They can reduce the chance of early death if you have had a heart attack. All medicines can cause side effects.  So it is important to understand the pros and cons of any medicine you take. It is also important to take your medicines exactly as your doctor tells you to. Follow-up care is a key part of your treatment and safety. Be sure to make and go to all appointments, and call your doctor if you are having problems. It's also a good idea to know your test results and keep a list of the medicines you take. ACE inhibitors ACE (angiotensin-converting enzyme) inhibitors are used for three main reasons. They lower blood pressure, protect the kidneys, and prevent heart attacks and strokes. Examples include benazepril (Lotensin), lisinopril (Prinivil, Zestril), and ramipril (Altace). Before you start taking an ACE inhibitor, make sure your doctor knows if: 
· You are taking a water pill (diuretic). · You are taking potassium pills or using salt substitutes. · You are pregnant or breastfeeding. · You have had a kidney transplant or other kidney problems. ACE inhibitors can cause side effects. Call your doctor right away if you have: · Trouble breathing. · Swelling in your face, head, neck, or tongue. · Dizziness or lightheadedness. · A dry cough. Statins Statins lower cholesterol. Examples include atorvastatin (Lipitor), lovastatin (Mevacor), pravastatin (Pravachol), and simvastatin (Zocor). Before you start taking a statin, make sure your doctor knows if: 
· You have had a kidney transplant or other kidney problems. · You have liver disease. · You take any other prescription medicine, over-the-counter medicine, vitamins, supplements, or herbal remedies. · You are pregnant or breastfeeding. Statins can cause side effects. Call your doctor right away if you have: · New, severe muscle aches. · Brown urine. Aspirin Taking an aspirin every day can lower your risk for a heart attack. A heart attack occurs when a blood vessel in the heart gets blocked.  When this happens, oxygen can't get to the heart muscle, and part of the heart dies. Aspirin can help prevent blood clots that can block the blood vessels. Talk to your doctor before you start taking aspirin every day. He or she may recommend that you take one low-dose aspirin (81 mg) tablet each day, with a meal and a full glass of water. Taking aspirin isn't right for everyone. This is because it can cause serious bleeding. And you may not be able to use aspirin if you: 
· Have asthma. · Have an ulcer or other stomach problem. · Take some other medicine (called a blood thinner) that prevents blood clots. · Are allergic to aspirin. Before having a surgery or procedure, tell your doctor or dentist that you take aspirin. He or she will tell you if you should stop taking aspirin beforehand. Make sure that you understand exactly what your doctor wants you to do. Aspirin can cause side effects. Call your doctor right away if you have: · Unusual bleeding or bruising. · Nausea, vomiting, or heartburn. · Black or bloody stools. Beta-blockers Beta-blockers are used for three main reasons. They lower blood pressure, relieve angina symptoms (such as chest pain or pressure), and reduce the chances of a second heart attack. They include atenolol (Tenormin), carvedilol (Coreg), and metoprolol (Lopressor). Before you start taking a beta-blocker, make sure your doctor knows if you have: · Severe asthma or frequent asthma attacks. · A very slow pulse (less than 55 beats a minute). Beta-blockers can cause side effects. Call your doctor right away if you have: · Wheezing or trouble breathing. · Dizziness or lightheadedness. · Asthma that gets worse. When should you call for help? Watch closely for changes in your health, and be sure to contact your doctor if you have any problems. Where can you learn more? Go to http://caleb-taylor.info/. Enter R428 in the search box to learn more about \"Reducing Heart Attack Risk With Daily Medicine: Care Instructions. \" 
 Current as of: September 21, 2016 Content Version: 11.4 © 4861-3984 [employer]. Care instructions adapted under license by [employer] (which disclaims liability or warranty for this information). If you have questions about a medical condition or this instruction, always ask your healthcare professional. [address] any warranty or liability for your use of this information. Clopidogrel (By mouth) Clopidogrel ([id number]-cr-grel) Helps prevent stroke, heart attack, and other heart problems. This medicine is a platelet inhibitor. Brand Name(s): Plavix There may be other brand names for this medicine. When This Medicine Should Not Be Used: This medicine is not right for everyone. Do not use it if you had an allergic reaction to clopidogrel. How to Use This Medicine:  
Tablet · Your doctor will tell you how much medicine to use. Do not use more than directed. · This medicine should come with a Medication Guide. Ask your pharmacist for a copy if you do not have one. · Missed dose: Take a dose as soon as you remember. If it is almost time for your next dose, wait until then and take a regular dose. Do not take extra medicine to make up for a missed dose. · Store the medicine in a closed container at room temperature, away from heat, moisture, and direct light. Drugs and Foods to Avoid: Ask your doctor or pharmacist before using any other medicine, including over-the-counter medicines, vitamins, and herbal products. · Some medicines can affect how clopidogrel works. Tell your doctor if you are using any of the following: ¨ Esomeprazole, omeprazole, repaglinide, or warfarin ¨ Medicine to treat depression ¨ NSAID pain or arthritis medicine (including celecoxib, diclofenac, ibuprofen, or naproxen) Warnings While Using This Medicine: · Tell your doctor if you are pregnant or breastfeeding, or if you have bleeding problems, stomach ulcer or bleeding, a recent stroke, or have a history of bleeding problems. · This medicine can cause you to bleed and bruise more easily. Take precautions to avoid injury. Brush and floss your teeth gently, do not play rough sports, and be careful with sharp objects. Severe bleeding can be life-threatening. · This medicine may cause a rare but serious blood clotting condition called thrombotic thrombocytopenic purpura. · Make sure any doctor or dentist who treats you knows that you are using this medicine. Tell your doctor if you plan to have surgery or a dental procedure. · Do not stop using this medicine suddenly. Your doctor will need to slowly decrease your dose before you stop it completely. · Your doctor will check your progress and the effects of this medicine at regular visits. Keep all appointments. · Keep all medicine out of the reach of children. Never share your medicine with anyone. Possible Side Effects While Using This Medicine:  
Call your doctor right away if you notice any of these side effects: · Allergic reaction: Itching or hives, swelling in your face or hands, swelling or tingling in your mouth or throat, chest tightness, trouble breathing · Bloody or black, tarry stools · Nosebleeds · Pinpoint red or purple spots on your skin or in your mouth · Problems with vision, speech, or walking · Red or dark brown urine · Seizures · Severe stomach pain · Trouble breathing, tiredness, fast heartbeat, yellow skin or eyes · Unusual bleeding, bruising, or weakness · Vomiting of blood or vomit that looks like coffee grounds If you notice other side effects that you think are caused by this medicine, tell your doctor. Call your doctor for medical advice about side effects. You may report side effects to FDA at 5-392-FDA-5987 © 2017 [address] Information is for End User's use only and may not be sold, redistributed or otherwise used for commercial purposes. The above information is an  only. It is not intended as medical advice for individual conditions or treatments. Talk to your doctor, nurse or pharmacist before following any medical regimen to see if it is safe and effective for you. Stopping Smoking: Care Instructions Your Care Instructions Cigarette smokers crave the nicotine in cigarettes. Giving it up is much harder than simply changing a habit. Your body has to stop craving the nicotine. It is hard to quit, but you can do it. There are many tools that people use to quit smoking. You may find that combining tools works best for you. There are several steps to quitting. First you get ready to quit. Then you get support to help you. After that, you learn new skills and behaviors to become a nonsmoker. For many people, a necessary step is getting and using medicine. Your doctor will help you set up the plan that best meets your needs. You may want to attend a smoking cessation program to help you quit smoking. When you choose a program, look for one that has proven success. Ask your doctor for ideas. You will greatly increase your chances of success if you take medicine as well as get counseling or join a cessation program. 
Some of the changes you feel when you first quit tobacco are uncomfortable. Your body will miss the nicotine at first, and you may feel short-tempered and grumpy. You may have trouble sleeping or concentrating. Medicine can help you deal with these symptoms. You may struggle with changing your smoking habits and rituals. The last step is the tricky one: Be prepared for the smoking urge to continue for a time. This is a lot to deal with, but keep at it. You will feel better. Follow-up care is a key part of your treatment and safety.  Be sure to make and go to all appointments, and call your doctor if you are having problems. It's also a good idea to know your test results and keep a list of the medicines you take. How can you care for yourself at home? · Ask your family, friends, and coworkers for support. You have a better chance of quitting if you have help and support. · Join a support group, such as Nicotine Anonymous, for people who are trying to quit smoking. · Consider signing up for a smoking cessation program, such as the American Lung Association's Freedom from Smoking program. 
· Set a quit date. Pick your date carefully so that it is not right in the middle of a big deadline or stressful time. Once you quit, do not even take a puff. Get rid of all ashtrays and lighters after your last cigarette. Clean your house and your clothes so that they do not smell of smoke. · Learn how to be a nonsmoker. Think about ways you can avoid those things that make you reach for a cigarette. ¨ Avoid situations that put you at greatest risk for smoking. For some people, it is hard to have a drink with friends without smoking. For others, they might skip a coffee break with coworkers who smoke. ¨ Change your daily routine. Take a different route to work or eat a meal in a different place. · Cut down on stress. Calm yourself or release tension by doing an activity you enjoy, such as reading a book, taking a hot bath, or gardening. · Talk to your doctor or pharmacist about nicotine replacement therapy, which replaces the nicotine in your body. You still get nicotine but you do not use tobacco. Nicotine replacement products help you slowly reduce the amount of nicotine you need. These products come in several forms, many of them available over-the-counter: ¨ Nicotine patches ¨ Nicotine gum and lozenges ¨ Nicotine inhaler · Ask your doctor about bupropion (Wellbutrin) or varenicline (Chantix), which are prescription medicines. They do not contain nicotine.  They help you by reducing withdrawal symptoms, such as stress and anxiety. · Some people find hypnosis, acupuncture, and massage helpful for ending the smoking habit. · Eat a healthy diet and get regular exercise. Having healthy habits will help your body move past its craving for nicotine. · Be prepared to keep trying. Most people are not successful the first few times they try to quit. Do not get mad at yourself if you smoke again. Make a list of things you learned and think about when you want to try again, such as next week, next month, or next year. Where can you learn more? Go to http://calebCorkCRMtaylor.info/. Enter Z506 in the search box to learn more about \"Stopping Smoking: Care Instructions. \" Current as of: March 20, 2017 Content Version: 11.4 © 0988-8141 [employer]. Care instructions adapted under license by [employer] (which disclaims liability or warranty for this information). If you have questions about a medical condition or this instruction, always ask your healthcare professional. [place] [address] any warranty or liability for your use of this information. Secondhand Smoke: Care Instructions Your Care Instructions Secondhand smoke comes from the burning end of a cigarette, cigar, or pipe and the smoke that a smoker exhales. The smoke contains nicotine and many other harmful chemicals. Breathing secondhand smoke can cause or worsen health problems including cancer, asthma, coronary artery disease, and respiratory infections. It can make your eyes and nose burn and cause a sore throat. Secondhand smoke is especially bad for babies and young children whose lungs are still developing. Babies whose parents smoke are more likely to have ear infections, pneumonia, and bronchitis in the first few years of their lives. Secondhand smoke can make asthma symptoms worse in children. If you are pregnant, it is important that you not smoke and that you avoid secondhand smoke. You are more likely to give birth to a baby who weighs less than expected (low birth weight) if you smoke. And your baby may have a greater risk for sudden infant death syndrome (SIDS). Babies whose mothers are exposed to secondhand smoke during pregnancy have a higher risk for health problems. Follow-up care is a key part of your treatment and safety. Be sure to make and go to all appointments, and call your doctor if you are having problems. It's also a good idea to know your test results and keep a list of the medicines you take. How can you care for yourself at home? · Do not smoke or let anyone else smoke in your home. If people must smoke, ask them to go outside. · If people do smoke in your home, choose a room where you can open a window or use a fan to get the smoke outside. · Do not let anyone smoke in your car. If someone must smoke, pull over in a safe place and let him or her smoke away from the car. · Ask your employer to make sure that you have a smoke-free work area. · Make sure that your children are not exposed to secondhand smoke at day care, school, and after-school programs. · Try to choose nonsmoking bars, restaurants, and other public places when you go out. · Help your family and friends who smoke to quit by encouraging them to try. Tell them about treatment resources. Having support from others often helps. · If you smoke, quit. Quitting is hard, but there are ways to boost your chance of quitting tobacco for good. ¨ Use nicotine gum, patches, or lozenges. Call a quitline. Ask your doctor about stop-smoking programs and medicines. ¨ Keep trying. When should you call for help? Watch closely for changes in your health, and be sure to contact your doctor if you have any problems. Where can you learn more? Go to http://caleb-taylor.info/. Enter L004 in the search box to learn more about \"Secondhand Smoke: Care Instructions. \" Current as of: March 20, 2017 Content Version: 11.4 © 1480-4839 [employer]. Care instructions adapted under license by [employer] (which disclaims liability or warranty for this information). If you have questions about a medical condition or this instruction, always ask your healthcare professional. [address] any warranty or liability for your use of this information. Learning About Electronic Cigarettes What are electronic cigarettes? Electronic cigarettes are battery-powered devices that turn liquid nicotine into a vapor that you breathe in. Many of them are made to look like real cigarettes. Some have a light at the end that glows when you breathe in. These cigarettes are often called e-cigarettes. Using an e-cigarette is also called vaping. Some people use e-cigarettes to try to quit smoking. E-cigarettes may help satisfy nicotine cravings for adults. How do e-cigarettes work? E-cigarettes have three main parts: · The mouthpiece. It has a cartridge that holds liquid nicotine. Some also contain a flavor. · A heating element. It turns the liquid into a vapor when you breathe in. · A battery. It provides power to the heating element. A chemical in the vapor turns it white. This makes it look like smoke. Electronic cigars and pipes are also available. Are e-cigarettes safe? More research is needed before we know if e-cigarettes are safe for people to use. The long-term health effects of using them are not known. Just as with leaf-tobacco cigarettes, the nicotine in e-cigarettes is addictive. And they do contain small amounts of harmful chemicals. It is not yet known how much of these chemicals and nicotine is breathed in with e-cigarettes. The U.S. Food and Drug Administration (FDA) has rules for e-cigarettes. Some of the rules are required health warnings, no sales to those younger than 18, and no free samples. It is now more common for teens and young adults to use e-cigarettes than cigarettes. Any product that contains nicotine is especially unsafe for this age group. Be sure to keep e-cigarettes out of the reach of children. The nicotine is poisonous. It can be lethal if swallowed. Can e-cigarettes help you stop smoking? If you are thinking about using e-cigarettes to help you quit smoking, talk to your doctor first. 
It is not known if e-cigarettes will help you stop smoking. E-cigarette cartridges come with different levels of nicotine. Some people try to lower the nicotine levels over time. But it is not known if this is a healthy or effective way to control nicotine amounts. Using e-cigarettes may keep your addiction to nicotine active. This could make it harder to quit. And there is some concern that the use of these by new users could lead to use of other tobacco products, such as leaf-tobacco cigarettes. Experts agree that there is not enough evidence to recommend using e-cigarettes to quit smoking. Where can you learn more? Go to http://caleb-taylor.info/. Enter C351 in the search box to learn more about \"Learning About Electronic Cigarettes. \" Current as of: March 20, 2017 Content Version: 11.4 © 9560-9269 [employer], [employer]orporated. Care instructions adapted under license by [employer] (which disclaims liability or warranty for this information). If you have questions about a medical condition or this instruction, always ask your healthcare professional. [place] [address] any warranty or liability for your use of this information. Please provide this summary of care documentation to your next provider.  
  
  
 
  
Signatures-by signing, you are acknowledging that this After Visit Summary has been reviewed with you and you have received a copy. Patient Signature:  ____________________________________________________________ Date:  ____________________________________________________________  
  
Gill Gaetano Provider Signature:  ____________________________________________________________ Date:  ____________________________________________________________

## 2018-05-30 ENCOUNTER — HOME HEALTH ADMISSION (OUTPATIENT)
Dept: HOME HEALTH SERVICES | Facility: HOME HEALTH | Age: 63
End: 2018-05-30

## 2018-05-30 LAB
ANION GAP SERPL CALC-SCNC: 10 MMOL/L (ref 7–16)
APTT PPP: 29.5 SEC (ref 23.2–35.3)
APTT PPP: 34.4 SEC (ref 23.2–35.3)
BUN SERPL-MCNC: 10 MG/DL (ref 8–23)
CALCIUM SERPL-MCNC: 7.8 MG/DL (ref 8.3–10.4)
CHLORIDE SERPL-SCNC: 106 MMOL/L (ref 98–107)
CO2 SERPL-SCNC: 26 MMOL/L (ref 21–32)
CREAT SERPL-MCNC: 1.53 MG/DL (ref 0.8–1.5)
ERYTHROCYTE [DISTWIDTH] IN BLOOD BY AUTOMATED COUNT: 14.6 % (ref 11.9–14.6)
GLUCOSE SERPL-MCNC: 135 MG/DL (ref 65–100)
HCT VFR BLD AUTO: 31.5 % (ref 41.1–50.3)
HGB BLD-MCNC: 10.3 G/DL (ref 13.6–17.2)
MCH RBC QN AUTO: 28.1 PG (ref 26.1–32.9)
MCHC RBC AUTO-ENTMCNC: 32.7 G/DL (ref 31.4–35)
MCV RBC AUTO: 85.8 FL (ref 79.6–97.8)
PLATELET # BLD AUTO: 180 K/UL (ref 150–450)
PMV BLD AUTO: 11.6 FL (ref 10.8–14.1)
POTASSIUM SERPL-SCNC: 3.9 MMOL/L (ref 3.5–5.1)
RBC # BLD AUTO: 3.67 M/UL (ref 4.23–5.67)
SODIUM SERPL-SCNC: 142 MMOL/L (ref 136–145)
WBC # BLD AUTO: 7.3 K/UL (ref 4.3–11.1)

## 2018-05-30 PROCEDURE — 74011250637 HC RX REV CODE- 250/637: Performed by: SURGERY

## 2018-05-30 PROCEDURE — 04CK0ZZ EXTIRPATION OF MATTER FROM RIGHT FEMORAL ARTERY, OPEN APPROACH: ICD-10-PCS | Performed by: SURGERY

## 2018-05-30 PROCEDURE — 85730 THROMBOPLASTIN TIME PARTIAL: CPT | Performed by: SURGERY

## 2018-05-30 PROCEDURE — 85027 COMPLETE CBC AUTOMATED: CPT | Performed by: SURGERY

## 2018-05-30 PROCEDURE — 97161 PT EVAL LOW COMPLEX 20 MIN: CPT

## 2018-05-30 PROCEDURE — 80048 BASIC METABOLIC PNL TOTAL CA: CPT | Performed by: SURGERY

## 2018-05-30 PROCEDURE — 74011000258 HC RX REV CODE- 258: Performed by: SURGERY

## 2018-05-30 PROCEDURE — 97530 THERAPEUTIC ACTIVITIES: CPT

## 2018-05-30 PROCEDURE — 36415 COLL VENOUS BLD VENIPUNCTURE: CPT | Performed by: SURGERY

## 2018-05-30 PROCEDURE — 65660000004 HC RM CVT STEPDOWN

## 2018-05-30 PROCEDURE — 04UK0KZ SUPPLEMENT RIGHT FEMORAL ARTERY WITH NONAUTOLOGOUS TISSUE SUBSTITUTE, OPEN APPROACH: ICD-10-PCS | Performed by: SURGERY

## 2018-05-30 PROCEDURE — 74011250636 HC RX REV CODE- 250/636: Performed by: SURGERY

## 2018-05-30 RX ORDER — CLOPIDOGREL BISULFATE 75 MG/1
75 TABLET ORAL DAILY
Status: DISCONTINUED | OUTPATIENT
Start: 2018-05-31 | End: 2018-05-31 | Stop reason: HOSPADM

## 2018-05-30 RX ADMIN — DEXTROSE MONOHYDRATE AND SODIUM CHLORIDE 1000 ML: 5; .9 INJECTION, SOLUTION INTRAVENOUS at 03:13

## 2018-05-30 RX ADMIN — MORPHINE SULFATE 5 MG: 10 INJECTION INTRAMUSCULAR; INTRAVENOUS; SUBCUTANEOUS at 19:56

## 2018-05-30 RX ADMIN — ATORVASTATIN CALCIUM 40 MG: 40 TABLET, FILM COATED ORAL at 19:56

## 2018-05-30 RX ADMIN — LISINOPRIL 10 MG: 5 TABLET ORAL at 08:34

## 2018-05-30 RX ADMIN — ATENOLOL 50 MG: 50 TABLET ORAL at 08:36

## 2018-05-30 RX ADMIN — OXYCODONE HYDROCHLORIDE AND ACETAMINOPHEN 1 TABLET: 5; 325 TABLET ORAL at 09:57

## 2018-05-30 RX ADMIN — OXYCODONE HYDROCHLORIDE AND ACETAMINOPHEN 1 TABLET: 5; 325 TABLET ORAL at 03:11

## 2018-05-30 RX ADMIN — OXYCODONE HYDROCHLORIDE AND ACETAMINOPHEN 1 TABLET: 5; 325 TABLET ORAL at 19:08

## 2018-05-30 RX ADMIN — ONDANSETRON 4 MG: 2 INJECTION INTRAMUSCULAR; INTRAVENOUS at 19:56

## 2018-05-30 RX ADMIN — AMLODIPINE BESYLATE 10 MG: 10 TABLET ORAL at 08:36

## 2018-05-30 RX ADMIN — Medication 10 ML: at 13:51

## 2018-05-30 RX ADMIN — TAMSULOSIN HYDROCHLORIDE 0.4 MG: 0.4 CAPSULE ORAL at 19:56

## 2018-05-30 RX ADMIN — OXYCODONE HYDROCHLORIDE AND ACETAMINOPHEN 1 TABLET: 5; 325 TABLET ORAL at 13:49

## 2018-05-30 RX ADMIN — OXYCODONE HYDROCHLORIDE AND ACETAMINOPHEN 1 TABLET: 5; 325 TABLET ORAL at 23:58

## 2018-05-30 RX ADMIN — Medication 10 ML: at 20:07

## 2018-05-30 RX ADMIN — ASPIRIN 81 MG: 81 TABLET, COATED ORAL at 08:34

## 2018-05-30 RX ADMIN — Medication 10 ML: at 06:08

## 2018-05-30 NOTE — PROGRESS NOTES
No complaints  R foot warm, excellent doppler PT  Dressings C/D/I    Ambulate today. Ok for transfer if need bed. Continue heparin  Gtt.   Restart ASA/plavix

## 2018-05-30 NOTE — PROGRESS NOTES
Problem: Mobility Impaired (Adult and Pediatric)  Goal: *Acute Goals and Plan of Care (Insert Text)  Short Term Goals:  1. Mr. Jennifer Bill will move from supine to sit and sit to supine in flat bed via log roll with INDEPENDENT within 3 day(s). 2.  Mr. Jennifer Bill will transfer from sit to stand and stand to sit with INDEPENDENT  within 3 days. 3.  Mr. Jennifer Bill will ambulate with MODIFIED INDEPENDENCE for 150+ feet with the least restrictive device within 3 day(s). 4.  Mr. Jennifer Bill will negotiate 1 step with B handrails within 3 days. ________________________________________________________________________      PHYSICAL THERAPY: Initial Assessment, Treatment Day: Day of Assessment, AM 5/30/2018  INPATIENT: Hospital Day: 2  Payor: Hadley Epley / Plan: [address] / Product Type: PPO /      NAME/AGE/GENDER: Katt Moralez Sr. is a 60 y.o. male   PRIMARY DIAGNOSIS: Ischemia [I99.8] Ischemia of right lower extremity Ischemia of right lower extremity  Procedure(s) (LRB):  right femoral popliteal bypass thrombectomy, proximal distal angiolasty, profundaplasty (Right)  1 Day Post-Op  ICD-10: Treatment Diagnosis:    · Difficulty in walking, Not elsewhere classified (R26.2)   Precaution/Allergies: Other food and Shellfish derived      ASSESSMENT:     Mr. Jennifer Bill presents supine in bed with wife at bedside. He endorsed 8/10 pain in R groin area and RN administered pain medication. Sensation diminished R LE and He transitioned to sit with [facility] elevated with extra effort and supervision, stood with supervision. Patient ambulated 48' with RW and CGA, self limiting to TTWBing R LE. Ambulates with R hip/knee flexed and decreased stance phase R LE. Patient has experienced a decline in functional mobility after undergoing above surgery, however, he is very motivated to improve. Mr. Jennifer Bill would benefit from skilled physical therapy (medically necessary) to address his deficits and maximize his function. Worked on gait pattern with cueing for foot flat during stance phase and patient ambulated 50' more with RW and CGA. Did demonstrate progress with gait pattern, will continue to work towards goals. This section established at most recent assessment   PROBLEM LIST (Impairments causing functional limitations):  1. Decreased Strength  2. Decreased ADL/Functional Activities  3. Decreased Transfer Abilities  4. Decreased Ambulation Ability/Technique  5. Decreased Balance  6. Increased Pain  7. Decreased Activity Tolerance   INTERVENTIONS PLANNED: (Benefits and precautions of physical therapy have been discussed with the patient.)  1. Balance Exercise  2. Bed Mobility  3. Gait Training  4. Therapeutic Activites  5. Therapeutic Exercise/Strengthening  6. Transfer Training  7. eduation  8. Group Therapy     TREATMENT PLAN: Frequency/Duration: daily for duration of hospital stay  Rehabilitation Potential For Stated Goals: Excellent     RECOMMENDED REHABILITATION/EQUIPMENT: (at time of discharge pending progress): Due to the probability of continued deficits (see above) this patient will likely need continued skilled physical therapy after discharge. Equipment:    None at this time              HISTORY:   History of Present Injury/Illness (Reason for Referral): Admitted for above surgery. Past Medical History/Comorbidities:   Mr. Osmany Pappas  has a past medical history of Adverse effect of anesthesia; Asthma; Calculus of kidney; Diabetes ([address] Former cigarette smoker; History of kidney stones (2006); [place] (hard of hearing); Hypercholesterolemia; Hypertension; Iron deficiency anemia; Leg pain, bilateral; Long term (current) use of anticoagulants; Nicotine vapor product user; PAD (peripheral artery disease) ([facility] [address] Stroke [facility]) (2006 & 2010); and Thromboembolus [facility]). He also has no past medical history of Aneurysm ([address] Arrhythmia; Arthritis; Autoimmune disease ([address] CAD (coronary artery disease); Cancer ([address]  Chronic kidney disease; Chronic obstructive pulmonary disease ([address] Chronic pain; Coagulation disorder ([address] Difficult intubation; Endocarditis; GERD (gastroesophageal reflux disease); Heart failure ([address] Ill-defined condition; Liver disease; Malignant hyperthermia due to anesthesia; Morbid obesity ([address] Nausea & vomiting; Pseudocholinesterase deficiency; Psychiatric disorder; PUD (peptic ulcer disease); Rheumatic fever; Seizures ([address] Sleep apnea; or Thyroid disease. Mr. Francisco Díaz  has a past surgical history that includes hx colonoscopy; vascular surgery procedure unlist (Left, 2006); vascular surgery procedure unlist (Right, 05/2013); vascular surgery procedure unlist (Right, 11/11/2016); vascular surgery procedure unlist (Right, 08/30/2017); and vascular surgery procedure unlist (Bilateral, 04/20/2018). Social History/Living Environment:   Home Environment: Private residence  # Steps to Enter: 1  One/Two Story Residence: One story  Living Alone: No  Support Systems: Spouse/Significant Other/Partner  Patient Expects to be Discharged to[de-identified] Private residence  Current DME Used/Available at Home: Teodora Hedges, straight, Shower chair, Walker, rolling  Prior Level of Function/Work/Activity:  Lives at home with wife, independent in home and community, but ambulates limited distance due to pain. Number of Personal Factors/Comorbidities that affect the Plan of Care: 1-2: MODERATE COMPLEXITY   EXAMINATION:   Most Recent Physical Functioning:   Gross Assessment:  AROM: Generally decreased, functional  Strength: Generally decreased, functional  Sensation: Impaired (R LE)               Posture:  Posture (WDL): Exceptions to WDL  Posture Assessment: Forward head, Rounded shoulders  Balance:  Sitting: Intact  Standing: Impaired  Standing - Static: Fair  Standing - Dynamic : Fair Bed Mobility:  Supine to Sit: Supervision; Additional time  Scooting: Supervision; Additional time  Wheelchair Mobility:     Transfers:  Sit to Stand: Supervision  Stand to Sit: Supervision  Bed to Chair: Contact guard assistance  Gait:  Right Side Weight Bearing: Toe touch (self limiting due to pain)  Base of Support: Center of gravity altered; Widened  Speed/Hien: Slow  Step Length: Left shortened;Right shortened  Stance: Right decreased  Distance (ft): 50 Feet (ft) (x2)  Assistive Device: Gait belt;Walker, rolling  Ambulation - Level of Assistance: Contact guard assistance      Body Structures Involved:  1. Muscles Body Functions Affected:  1. Sensory/Pain  2. Movement Related  3. Skin Related Activities and Participation Affected:  1. Mobility  2. Self Care  3. Domestic Life  4. Community, Social and [place]   Number of elements that affect the Plan of Care: 4+: HIGH COMPLEXITY   CLINICAL PRESENTATION:   Presentation: Evolving clinical presentation with changing clinical characteristics: MODERATE COMPLEXITY   CLINICAL DECISION MAKIN [facility] Inpatient Short Form  How much difficulty does the patient currently have. .. Unable A Lot A Little None   1. Turning over in bed (including adjusting bedclothes, sheets and blankets)? [] 1   [] 2   [x] 3   [] 4   2. Sitting down on and standing up from a chair with arms ( e.g., wheelchair, bedside commode, etc.)   [] 1   [] 2   [x] 3   [] 4   3. Moving from lying on back to sitting on the side of the bed? [] 1   [] 2   [x] 3   [] 4   How much help from another person does the patient currently need. .. Total A Lot A Little None   4. Moving to and from a bed to a chair (including a wheelchair)? [] 1   [] 2   [x] 3   [] 4   5. Need to walk in hospital room? [] 1   [] 2   [x] 3   [] 4   6. Climbing 3-5 steps with a railing? [] 1   [] 2   [x] 3   [] 4   © , Trustees of [address] [place] [address] [address] [address], under license to [employer].  All rights reserved      Score:  Initial: 18 Most Recent: X (Date: -- )    Interpretation of Tool:  Represents activities that are increasingly more difficult (i.e. Bed mobility, Transfers, Gait). Score 24 23 22-20 19-15 14-10 9-7 6     Modifier CH CI CJ CK CL CM CN      ? Mobility - Walking and Moving Around:     - CURRENT STATUS: CK - 40%-59% impaired, limited or restricted    - GOAL STATUS: CJ - 20%-39% impaired, limited or restricted    - D/C STATUS:  ---------------To be determined---------------  Payor: BLUE CROSS / Plan: [place] BLUE CROSS OF [address] / Product Type: PPO /      Medical Necessity:     · Patient is expected to demonstrate progress in strength, balance and functional technique to increase independence with   and improve safety during all functional mobility. Reason for Services/Other Comments:  · Patient continues to require skilled intervention due to medical complications and decline in functional mobility. Use of outcome tool(s) and clinical judgement create a POC that gives a: Clear prediction of patient's progress: LOW COMPLEXITY            TREATMENT:   (In addition to Assessment/Re-Assessment sessions the following treatments were rendered)   Pre-treatment Symptoms/Complaints:    Pain: Initial:   Pain Intensity 1: 8  Pain Location 1: Groin  Pain Orientation 1: Right  Pain Intervention(s) 1: Repositioned, Nurse notified  Post Session:  8     Therapeutic Activity: (    10 minutes): Therapeutic activities including Ambulation on level ground to improve gait pattern. Required minimal cueing   to promote foot flat R LE during stance phase. Braces/Orthotics/Lines/Etc:   · O2 Device: Room air  Treatment/Session Assessment:    · Response to Treatment:  Pleasant and cooperative despite severe pain.   · Interdisciplinary Collaboration:   o Physical Therapist  o Registered Nurse  · After treatment position/precautions:   o Up in chair  o Bed/Chair-wheels locked  o Call light within reach  o RN notified  o Family at bedside   · Compliance with Program/Exercises: compliant all of the time.  · Recommendations/Intent for next treatment session: \"Next visit will focus on advancements to more challenging activities and reduction in assistance provided\".   Total Treatment Duration:  PT Patient Time In/Time Out  Time In: 0958  Time Out: [address], PT, DPT

## 2018-05-30 NOTE — PROGRESS NOTES
Problem: Falls - Risk of  Goal: *Absence of Falls  Document Sebastián Fall Risk and appropriate interventions in the flowsheet.    Outcome: Progressing Towards Goal  Fall Risk Interventions:  Mobility Interventions: Patient to call before getting OOB         Medication Interventions: Patient to call before getting OOB, Teach patient to arise slowly    Elimination Interventions: Call light in reach, Patient to call for help with toileting needs, Toileting schedule/hourly rounds, Urinal in reach

## 2018-05-30 NOTE — PROGRESS NOTES
Bedside shift report received from Mary Alice Fraga RN (offgoing nurse). Report given to Guillaume Yu RN. Vital signs stable. No complaints present. Patient in stable condition.

## 2018-05-30 NOTE — PROGRESS NOTES
[address]  Face to Face Encounter    Patients Name: Perez Wong Sr. YOB: 1955    Ordering Physician: [place]    Primary Diagnosis: Ischemia [I99.8]    Date of Face to Face:   5/30/2018                                  Face to Face Encounter findings are related to primary reason for home care:   yes. 1. I certify that the patient needs intermittent care as follows: physical therapy: strengthening and gait/stair training    2. I certify that this patient is homebound, that is: 1) patient requires the use of a walker device, special transportation, or assistance of another to leave the home; or 2) patient's condition makes leaving the home medically contraindicated; and 3) patient has a normal inability to leave the home and leaving the home requires considerable and taxing effort. Patient may leave the home for infrequent and short duration for medical reasons, and occasional absences for non-medical reasons. Homebound status is due to the following functional limitations: Patient currently under activity restrictions secondary to recent surgical procedure, this hinders their ability to safely leave the home. 3. I certify that this patient is under my care and that I, or a nurse practitioner or  [id number], or clinical nurse specialist, or certified nurse midwife, working with me, had a Face-to-Face Encounter that meets the physician Face-to-Face Encounter requirements. The following are the clinical findings from the [address] [place] [address] encounter that support the need for skilled services and is a summary of the encounter:     See hospital chart. Jewels Aguilar LMSW  5/30/2018      THE FOLLOWING TO BE COMPLETED BY THE COMMUNITY PHYSICIAN:    I concur with the findings described above from the F encounter that this patient is homebound and in need of a skilled service.     Certifying Physician: _____________________________________      Printed Certifying Physician Name: _____________________________________    Date: _________________

## 2018-05-30 NOTE — PROGRESS NOTES
Problem: Falls - Risk of  Goal: *Absence of Falls  Document Sebastián Fall Risk and appropriate interventions in the flowsheet.    Outcome: Progressing Towards Goal  Fall Risk Interventions:  Mobility Interventions: Patient to call before getting OOB         Medication Interventions: Patient to call before getting OOB, Teach patient to arise slowly    Elimination Interventions: Call light in reach, Urinal in reach

## 2018-05-30 NOTE — PROGRESS NOTES
Bedside shift report given to Bruna Arauz RN (oncoming nurse) by Anais Keyes RN (offgoing nurse). Bedside shift report included the following information: SBAR, Kardex, ED Summary, MAR, and Recent Results.

## 2018-05-30 NOTE — PROGRESS NOTES
SW assessed pt with wife Monica Diaz ([phone]) at bedside. They live in a one level home with one small step through garage entrance. He is ADL-independent, doesn't use anything for ambulation, and has a RW, cane, crutches, and shower chair available, if needed. He verified his PCP and insurance as listed and doesn't have any trouble getting medications. They relocated to [place] three years ago from [place] and have some family here and others in [place]. At discharge, he has selected [facility]. Order and referral entered for [facility] and [address] notified. CM following. Care Management Interventions  PCP Verified by CM:  Yes  Last Visit to PCP: 12/28/17  Transition of Care Consult (CM Consult): [address]: Yes  Physical Therapy Consult: Yes  Current Support Network: Lives with Spouse, Own Home, Family Lives Nearby  Confirm Follow Up Transport: Family  Plan discussed with Pt/Family/Caregiver: Yes  Freedom of Choice Offered: Yes  Discharge Location  Discharge Placement: Home with [facility]

## 2018-05-30 NOTE — PROGRESS NOTES
Bedside and Verbal shift change report given to Dustin Antunez (oncoming nurse) by Brion Hashimoto (offgoing nurse). Report included the following information SBAR, Kardex, Intake/Output, MAR, Recent Results and Med Rec Status.

## 2018-05-30 NOTE — PROGRESS NOTES
Bedside shift report received from Shubham Ospina RN (offgoing nurse). Report given to Toya Herr RN. Vital signs stable. No complaints present. Patient in stable condition.

## 2018-05-30 NOTE — PROGRESS NOTES
Follow-up visit with Mr. Evelin Arroyo and his wife Yonis Hobosn. Conveyed care and concern, offered spiritual interventions including prayer as requested. Mr. Evelin Arroyo is excited to walk today and he looks forward to the therapist's visit.      [address] 67  Board Certified

## 2018-05-31 VITALS
OXYGEN SATURATION: 98 % | DIASTOLIC BLOOD PRESSURE: 89 MMHG | BODY MASS INDEX: 27.59 KG/M2 | HEART RATE: 60 BPM | RESPIRATION RATE: 18 BRPM | WEIGHT: 192.7 LBS | SYSTOLIC BLOOD PRESSURE: 157 MMHG | HEIGHT: 70 IN | TEMPERATURE: 98.3 F

## 2018-05-31 PROCEDURE — 97530 THERAPEUTIC ACTIVITIES: CPT

## 2018-05-31 PROCEDURE — 74011250636 HC RX REV CODE- 250/636: Performed by: SURGERY

## 2018-05-31 PROCEDURE — 74011250637 HC RX REV CODE- 250/637: Performed by: SURGERY

## 2018-05-31 PROCEDURE — 97110 THERAPEUTIC EXERCISES: CPT

## 2018-05-31 RX ORDER — OXYCODONE AND ACETAMINOPHEN 5; 325 MG/1; MG/1
1 TABLET ORAL
Qty: 30 TAB | Refills: 0 | Status: SHIPPED
Start: 2018-05-31 | End: 2019-03-13 | Stop reason: ALTCHOICE

## 2018-05-31 RX ORDER — ASPIRIN 81 MG/1
81 TABLET ORAL DAILY
Qty: 90 TAB | Status: SHIPPED | OUTPATIENT
Start: 2018-06-01 | End: 2019-03-13

## 2018-05-31 RX ADMIN — MORPHINE SULFATE 5 MG: 10 INJECTION INTRAMUSCULAR; INTRAVENOUS; SUBCUTANEOUS at 07:15

## 2018-05-31 RX ADMIN — AMLODIPINE BESYLATE 10 MG: 10 TABLET ORAL at 08:41

## 2018-05-31 RX ADMIN — OXYCODONE HYDROCHLORIDE AND ACETAMINOPHEN 1 TABLET: 5; 325 TABLET ORAL at 05:55

## 2018-05-31 RX ADMIN — LISINOPRIL 10 MG: 5 TABLET ORAL at 08:41

## 2018-05-31 RX ADMIN — Medication 10 ML: at 06:00

## 2018-05-31 RX ADMIN — ASPIRIN 81 MG: 81 TABLET, COATED ORAL at 08:41

## 2018-05-31 RX ADMIN — CLOPIDOGREL BISULFATE 75 MG: 75 TABLET ORAL at 08:41

## 2018-05-31 RX ADMIN — MORPHINE SULFATE 5 MG: 10 INJECTION INTRAMUSCULAR; INTRAVENOUS; SUBCUTANEOUS at 02:27

## 2018-05-31 RX ADMIN — ATENOLOL 50 MG: 50 TABLET ORAL at 08:40

## 2018-05-31 NOTE — PROGRESS NOTES
Reviewed discharge instructions and medications with patient and spouse and gave copies and prescriptions. Gave time for questions. Removed 2 PIV's and heart monitor.

## 2018-05-31 NOTE — PROGRESS NOTES
Bedside shift report given to Jasmeet Fu RN (oncoming nurse) by Antonio Davidson RN (offgoing nurse). Bedside shift report included the following information: SBAR, Kardex, ED Summary, MAR, and Recent Results.

## 2018-05-31 NOTE — PROGRESS NOTES
Reviewed discharge instructions and medications with patient and spouse and gave copies and prescriptions. Gave time for questions. Removed 2PIV's and heart monitor. Patient transported downstairs by w/c to go home. Patient stable upon discharge.

## 2018-05-31 NOTE — DISCHARGE INSTRUCTIONS
MD Instructions:  Wound care:  - Wash leg and groin wounds daily with liquid Dial soap (or other liquid antibacterial soap); use fingers or soft washcloth gently then pat area dry. - Very important: keep incision / staples clean and dry; cover groin incision with gauze. - Do not apply lotions, creams or ointments to incisions.  - Skin glue will fall off on its own. Diet:  - As tolerated before surgery. Activity:  - Don't overdo your activity throughout the day for the next 10-14 days - no prolonged standing, no lifting more than 8lb. - Keep legs propped up when not walking.  - No driving while taking narcotics. - Do not drink alcohol while taking narcotics. - May shower - no tub baths, soaking or swimming. Medications:  - Use prescription pain medications if needed; if you do not wish to use narcotic pain medication or require less pain control, you may take acetaminophen (Tylenol, for example) or naproxen (Aleve, for example) following instructions on the label.  - Resume other home medications, including aspirin and Plavix (clopidogrel).     Follow up in the office with Dr. Ash Schwarz / nurse practitioner Sd Reese on 6/14/2018 at 2:15 PM.    If problems or questions arise, please call our office at [phone]. DISCHARGE SUMMARY from Nurse    PATIENT INSTRUCTIONS:    After general anesthesia or intravenous sedation, for 24 hours or while taking prescription Narcotics:  · Limit your activities  · Do not drive and operate hazardous machinery  · Do not make important personal or business decisions  · Do  not drink alcoholic beverages  · If you have not urinated within 8 hours after discharge, please contact your surgeon on call.     Report the following to your surgeon:  · Excessive pain, swelling, redness or odor of or around the surgical area  · Temperature over 100.5  · Nausea and vomiting lasting longer than 4 hours or if unable to take medications  · Any signs of decreased circulation or nerve impairment to extremity: change in color, persistent  numbness, tingling, coldness or increase pain  · Any questions      *  Please give a list of your current medications to your Primary Care Provider. *  Please update this list whenever your medications are discontinued, doses are      changed, or new medications (including over-the-counter products) are added. *  Please carry medication information at all times in case of emergency situations. These are general instructions for a healthy lifestyle:    No smoking/ No tobacco products/ Avoid exposure to second hand smoke  Surgeon General's Warning:  Quitting smoking now greatly reduces serious risk to your health. Obesity, smoking, and sedentary lifestyle greatly increases your risk for illness    A healthy diet, regular physical exercise & weight monitoring are important for maintaining a healthy lifestyle    You may be retaining fluid if you have a history of heart failure or if you experience any of the following symptoms:  Weight gain of 3 pounds or more overnight or 5 pounds in a week, increased swelling in our hands or feet or shortness of breath while lying flat in bed. Please call your doctor as soon as you notice any of these symptoms; do not wait until your next office visit. Recognize signs and symptoms of STROKE:    F-face looks uneven    A-arms unable to move or move unevenly    S-speech slurred or non-existent    T-time-call 911 as soon as signs and symptoms begin-DO NOT go       Back to bed or wait to see if you get better-TIME IS BRAIN. Warning Signs of HEART ATTACK     Call 911 if you have these symptoms:   Chest discomfort. Most heart attacks involve discomfort in the center of the chest that lasts more than a few minutes, or that goes away and comes back. It can feel like uncomfortable pressure, squeezing, fullness, or pain.  Discomfort in other areas of the upper body.  Symptoms can include pain or discomfort in one or both arms, the back, neck, jaw, or stomach.  Shortness of breath with or without chest discomfort.  Other signs may include breaking out in a cold sweat, nausea, or lightheadedness. Don't wait more than five minutes to call 911 - MINUTES MATTER! Fast action can save your life. Calling 911 is almost always the fastest way to get lifesaving treatment. Emergency Medical Services staff can begin treatment when they arrive -- up to an hour sooner than if someone gets to the hospital by car. The discharge information has been reviewed with the patient and spouse. The patient and spouse verbalized understanding. Discharge medications reviewed with the patient and spouse and appropriate educational materials and side effects teaching were provided.   ___________________________________________________________________________________________________________________________________

## 2018-05-31 NOTE — PROGRESS NOTES
Discharge noted. SW notified Chip and [address] with [facility]. Care Management Interventions  PCP Verified by CM:  Yes  Last Visit to PCP: 12/28/17  Transition of Care Consult (CM Consult): [address]: Yes  Physical Therapy Consult: Yes  Current Support Network: Lives with Spouse, Own Home, Family Lives Nearby  Confirm Follow Up Transport: Family  Plan discussed with Pt/Family/Caregiver: Yes  Freedom of Choice Offered: Yes  Discharge Location  Discharge Placement: Home with [facility])

## 2018-05-31 NOTE — PROGRESS NOTES
Problem: Mobility Impaired (Adult and Pediatric)  Goal: *Acute Goals and Plan of Care (Insert Text)  Short Term Goals:  1. Mr. Geovany Coffey will move from supine to sit and sit to supine in flat bed via log roll with INDEPENDENT within 3 day(s). 2.  Mr. Geovany Coffey will transfer from sit to stand and stand to sit with INDEPENDENT  within 3 days. 3.  Mr. Geovany Coffey will ambulate with MODIFIED INDEPENDENCE for 150+ feet with the least restrictive device within 3 day(s). 4.  Mr. Geovany Coffey will negotiate 1 step with B handrails within 3 days. ________________________________________________________________________      PHYSICAL THERAPY: Daily Note, Treatment Day: 1st, AM 5/31/2018  INPATIENT: Hospital Day: 3  Payor: Antonio Sykes / Plan: [place] [employer] [facility] / Product Type: PPO /      NAME/AGE/GENDER: Guillaume Mathis Sr. is a 60 y.o. male   PRIMARY DIAGNOSIS: Ischemia [I99.8] Ischemia of right lower extremity Ischemia of right lower extremity  Procedure(s) (LRB):  right femoral popliteal bypass thrombectomy, proximal distal angiolasty, profundaplasty (Right)  2 Days Post-Op  ICD-10: Treatment Diagnosis:    · Difficulty in walking, Not elsewhere classified (R26.2)   Precaution/Allergies: Other food and Shellfish derived      ASSESSMENT:     Mr. Geovany Coffey presents supine in bed with spouse present. He is agreeable to treatment today. He got to edge of flat bed with Supervision. He then increased ambulation distance with rolling walker. He gait pattern is improved today. He ascended/descended 3 steps x 2 repetitions with supervision and verbal cues. Good progress noted with mobility this treatment. Will continue PT efforts. This section established at most recent assessment   PROBLEM LIST (Impairments causing functional limitations):  1. Decreased Strength  2. Decreased ADL/Functional Activities  3. Decreased Transfer Abilities  4. Decreased Ambulation Ability/Technique  5. Decreased Balance  6.  Increased Pain  7. Decreased Activity Tolerance   INTERVENTIONS PLANNED: (Benefits and precautions of physical therapy have been discussed with the patient.)  1. Balance Exercise  2. Bed Mobility  3. Gait Training  4. Therapeutic Activites  5. Therapeutic Exercise/Strengthening  6. Transfer Training  7. eduation  8. Group Therapy     TREATMENT PLAN: Frequency/Duration: daily for duration of hospital stay  Rehabilitation Potential For Stated Goals: Excellent     RECOMMENDED REHABILITATION/EQUIPMENT: (at time of discharge pending progress): Due to the probability of continued deficits (see above) this patient will likely need continued skilled physical therapy after discharge. Equipment:    None at this time              HISTORY:   History of Present Injury/Illness (Reason for Referral): Admitted for above surgery. Past Medical History/Comorbidities:   Mr. Cally Meadows  has a past medical history of Adverse effect of anesthesia; Asthma; Calculus of kidney; Diabetes ([address] Former cigarette smoker; History of kidney stones (2006); [place] (hard of hearing); Hypercholesterolemia; Hypertension; Iron deficiency anemia; Leg pain, bilateral; Long term (current) use of anticoagulants; Nicotine vapor product user; PAD (peripheral artery disease) ([address] Stroke [facility]) (2006 & 2010); and Thromboembolus [facility]). He also has no past medical history of Aneurysm ([address] Arrhythmia; Arthritis; Autoimmune disease ([address] CAD (coronary artery disease); Cancer ([address] Chronic kidney disease; Chronic obstructive pulmonary disease ([address] Chronic pain; Coagulation disorder ([address] Difficult intubation; Endocarditis; GERD (gastroesophageal reflux disease); Heart failure ([address] Ill-defined condition; Liver disease; Malignant hyperthermia due to anesthesia; Morbid obesity ([address] Nausea & vomiting; Pseudocholinesterase deficiency; Psychiatric disorder; PUD (peptic ulcer disease); Rheumatic fever; Seizures ([address] Sleep apnea; or Thyroid disease.   Mr. Cally Meadows  has a past surgical history that includes hx colonoscopy; vascular surgery procedure unlist (Left, 2006); vascular surgery procedure unlist (Right, 05/2013); vascular surgery procedure unlist (Right, 11/11/2016); vascular surgery procedure unlist (Right, 08/30/2017); and vascular surgery procedure unlist (Bilateral, 04/20/2018). Social History/Living Environment:   Home Environment: Private residence  # Steps to Enter: 1  One/Two Story Residence: One story  Living Alone: No  Support Systems: Spouse/Significant Other/Partner  Patient Expects to be Discharged to[de-identified] Private residence  Current DME Used/Available at Home: [employer], straight, Shower chair, Walker, rolling  Prior Level of Function/Work/Activity:  Lives at home with wife, independent in home and community, but ambulates limited distance due to pain. Number of Personal Factors/Comorbidities that affect the Plan of Care: 1-2: MODERATE COMPLEXITY   EXAMINATION:   Most Recent Physical Functioning:   Gross Assessment:                  Posture:  Posture (WDL): Exceptions to WDL  Posture Assessment: Forward head, Rounded shoulders  Balance:  Sitting: Intact  Standing: Impaired  Standing - Static: Fair  Standing - Dynamic : Fair Bed Mobility:  Supine to Sit: Supervision; Additional time  Scooting: Supervision; Additional time  Wheelchair Mobility:     Transfers:  Sit to Stand: Supervision  Stand to Sit: Supervision  Gait:  Right Side Weight Bearing: As tolerated  Base of Support: Widened  Speed/Hien: Slow  Step Length: Left shortened;Right shortened  Distance (ft): 250 Feet (ft)  Assistive Device: Walker, rolling  Ambulation - Level of Assistance: Stand-by assistance      Body Structures Involved:  1. Muscles Body Functions Affected:  1. Sensory/Pain  2. Movement Related  3. Skin Related Activities and Participation Affected:  1. Mobility  2. Self Care  3. Domestic Life  4.  Community, Social and [place]   Number of elements that affect the Plan of Care: 4+: HIGH COMPLEXITY   CLINICAL PRESENTATION:   Presentation: Evolving clinical presentation with changing clinical characteristics: MODERATE COMPLEXITY   CLINICAL DECISION MAKIN [facility] Mobility Inpatient Short Form  How much difficulty does the patient currently have. .. Unable A Lot A Little None   1. Turning over in bed (including adjusting bedclothes, sheets and blankets)? [] 1   [] 2   [x] 3   [] 4   2. Sitting down on and standing up from a chair with arms ( e.g., wheelchair, bedside commode, etc.)   [] 1   [] 2   [x] 3   [] 4   3. Moving from lying on back to sitting on the side of the bed? [] 1   [] 2   [x] 3   [] 4   How much help from another person does the patient currently need. .. Total A Lot A Little None   4. Moving to and from a bed to a chair (including a wheelchair)? [] 1   [] 2   [x] 3   [] 4   5. Need to walk in hospital room? [] 1   [] 2   [x] 3   [] 4   6. Climbing 3-5 steps with a railing? [] 1   [] 2   [x] 3   [] 4   © , Trustees of [address] [place] [address], under license to [employer]. All rights reserved      Score:  Initial: 18 Most Recent: X (Date: -- )    Interpretation of Tool:  Represents activities that are increasingly more difficult (i.e. Bed mobility, Transfers, Gait). Score 24 23 22-20 19-15 14-10 9-7 6     Modifier CH CI CJ CK CL CM CN      ? Mobility - Walking and Moving Around:     - CURRENT STATUS: CK - 40%-59% impaired, limited or restricted    - GOAL STATUS: CJ - 20%-39% impaired, limited or restricted    - D/C STATUS:  ---------------To be determined---------------  Payor: BLUE CROSS / Plan: [place] [employer] [address] / Product Type: PPO /      Medical Necessity:     · Patient is expected to demonstrate progress in strength, balance and functional technique to increase independence with   and improve safety during all functional mobility.   Reason for Services/Other Comments:  · Patient continues to require skilled intervention due to medical complications and decline in functional mobility. Use of outcome tool(s) and clinical judgement create a POC that gives a: Clear prediction of patient's progress: LOW COMPLEXITY            TREATMENT:   (In addition to Assessment/Re-Assessment sessions the following treatments were rendered)   Pre-treatment Symptoms/Complaints: \"This is my 9th rodeo. \"  Pain: Initial:   Pain Intensity 1: 0  Post Session:  Not rated     Therapeutic Activity: (    23 minutes): Therapeutic activities including Ambulation on level ground to improve gait pattern. Required minimal cueing   to promote foot flat R LE during stance phase. Braces/Orthotics/Lines/Etc:   · O2 Device: Room air  Treatment/Session Assessment:    · Response to Treatment:  Pleasant and tolerated treatment well. · Interdisciplinary Collaboration:   o Physical Therapy Assistant  o Registered Nurse  · After treatment position/precautions:   o Up in chair  o Bed/Chair-wheels locked  o Call light within reach  o RN notified  o Family at bedside   · Compliance with Program/Exercises: compliant all of the time. · Recommendations/Intent for next treatment session: \"Next visit will focus on advancements to more challenging activities and reduction in assistance provided\".   Total Treatment Duration:  PT Patient Time In/Time Out  Time In: 1030  Time Out: [address], PTA

## 2018-05-31 NOTE — DISCHARGE SUMMARY
[address]          Physician Discharge Summary     Patient: Maria L Walker Sr. MRN: [id number]  SSN: [id number]    YOB: 1955  Age: 60 y.o. Sex: male       Admission Date: 5/29/2018    Discharge Date: 5/31/2018      Admitting Physician: Davide Carmichael MD     Discharge Provider: Saroj Cisneros PA-C    Admission Diagnoses: Ischemia [I99.8]    Discharge Diagnoses:   Problem List as of 5/31/2018  Date Reviewed: 5/31/2018          Codes Class Noted - Resolved    Ischemia ICD-10-CM: I99.8  ICD-9-CM: 459.9  5/29/2018 - Present        * (Principal)Ischemia of right lower extremity ICD-10-CM: I99.8  ICD-9-CM: 459.9  11/11/2016 - Present        Atheroscler of native artery of right leg with intermit claudication [facility]) ICD-10-CM: V41.827  ICD-9-CM: 440.21  11/11/2016 - Present               Procedures for this Admission: Procedure(s):  Redo right lower extremity femoropopliteal bypass graft thrombectomy with proximal and distal anastomotic patch angioplasty, right profundoplasty with patch angioplasty. Discharged Condition: stable    Brief History: Maria L Walker Sr. was admitted with the following history of present illness. This is a 71-year-old gentleman with longstanding history of peripheral vascular disease who previously had undergone right lower extremity bypass x2 initially with a vein graft and ultimately with a PTFE graft, both of which have failed. He continues to have severe, debilitating, short distance claudication of the right leg. Angiographically, we were unable to recannulate the occluded PTFE graft. He was noted to have a proximal profunda femoral stenosis on this side as well. He was scheduled for attempted thrombectomy and possible redo bypass of the right leg as well as profundoplasty. Hospital Course:  On day of admission, the patient was taken to the operating room and underwent the above-noted procedures. After extubation in the OR and brief stay in PACU, he was transferred to [facility] to begin formal recovery. His pain was soon controlled on IV pain medications. He was continued on a heparin drip. He tolerated an advancing diet. He voided without difficulty. He participated actively with physical therapy. His pain was controlled with oral medications. On POD #2, he was stable and deemed suitable for discharge to home with home health PT. Consults: Urology    Significant Diagnostic Studies: labs    Treatments:   IV hydration  antibiotics: Ancef  analgesia: acetaminophen, morphine, oxycodone w/ acetaminophen  cardiac meds: lisinopril, atenolol, amlodipine  anticoagulation: heparin drip, ASA, Plavix   surgery: as noted above    Discharge Exam:  GENERAL: alert, cooperative, no distress  EYE: EOM intact, no nystagmus  LUNG: clear to auscultation bilaterally, normal respiratory effort  HEART: regular rate and rhythm  ABDOMEN: soft, nontender, nondistended, bowel sounds normoactive  EXTREMITIES: warm, normal ROM; right groin dressing removed, skin glue stuck to bandage prompted scant oozing from incision, otherwise groin incision intact without erythema / induration / ecchymosis; right medial leg with incision intact with staples, no erythema / induration / ecchymosis  PULSES: radial and brachial palpable 2+ and symmetric bilaterally; left dorsalis pedis and bilateral posterior tibialis signals available with Doppler    Disposition: home with home health physical therapy      Patient Instructions:   Current Discharge Medication List      START taking these medications    Details   aspirin delayed-release 81 mg tablet Take 1 Tab by mouth daily. Qty: 90 Tab, Refills: prn      oxyCODONE-acetaminophen (PERCOCET) 5-325 mg per tablet Take 1 Tab by mouth every six (6) hours as needed. Max Daily Amount: 4 Tabs.   Qty: 30 Tab, Refills: 0    Associated Diagnoses: Ischemia of right lower extremity CONTINUE these medications which have NOT CHANGED    Details   ferrous sulfate 325 mg (65 mg iron) tablet Take  by mouth Daily (before breakfast). atenolol (TENORMIN) 50 mg tablet Take 50 mg by mouth every morning. atorvastatin (LIPITOR) 40 mg tablet Take 40 mg by mouth nightly. amLODIPine (NORVASC) 10 mg tablet Take 10 mg by mouth every morning. lisinopril (PRINIVIL, ZESTRIL) 10 mg tablet Take 10 mg by mouth every morning. tamsulosin (FLOMAX) 0.4 mg capsule Take 0.4 mg by mouth nightly. clopidogrel (PLAVIX) 75 mg tablet Take 75 mg by mouth every morning. Indications: last dose 5/20/18             Reference MD discharge instructions, as well as those provided by nursing for diet, labs, medications, activity, wound care and any outpatient referrals. I have reviewed the patients controlled substance prescription history as maintained in the [place] prescription monitoring program so that the prescription(s) for a controlled substance can be given. Follow-up Appointments   Procedures    FOLLOW UP VISIT Appointment in: Other (Specify) Follow up in office with Dr. Fabienne Sharma / nurse practitioner Heydi Gonsalves on 6/14/2018 at 2:15 PM.     Follow up in office with Dr. Fabienne Sharma / nurse practitioner Heydi Gonsalves on 6/14/2018 at 2:15 PM.     Standing Status:   Standing     Number of Occurrences:   1     Order Specific Question:   Appointment in     Answer: Other (Specify)        Signed: Brett Rodríguez PA-C  5/31/2018 9:47 AM  Physician Assistant with Vascular Surgery Emile Palafox MD / Dipesh Alves MD / Mike Busby MD

## 2018-06-01 ENCOUNTER — HOME CARE VISIT (OUTPATIENT)
Dept: SCHEDULING | Facility: HOME HEALTH | Age: 63
End: 2018-06-01

## 2018-06-01 ENCOUNTER — HOME CARE VISIT (OUTPATIENT)
Dept: HOME HEALTH SERVICES | Facility: HOME HEALTH | Age: 63
End: 2018-06-01

## 2019-03-14 PROBLEM — Z95.828 H/O EXTREMITY BYPASS GRAFT: Status: ACTIVE | Noted: 2019-03-14

## 2019-05-31 PROBLEM — I73.9 PAD (PERIPHERAL ARTERY DISEASE) (HCC): Status: ACTIVE | Noted: 2019-05-31

## 2019-11-05 NOTE — OP NOTES
[address]  MR#: [id number]  : 1955  ACCOUNT #: [de-identified]   DATE OF SERVICE: 2018    SURGEON:  Addy Larkin M.D.    ASSISTANT:     PREOPERATIVE DIAGNOSIS:  Chronic right lower extremity ischemia. POSTOPERATIVE DIAGNOSIS:  Chronic right lower extremity ischemia. PROCEDURE:  Redo right lower extremity femoropopliteal bypass graft thrombectomy with proximal and distal anastomotic patch angioplasty, right profundoplasty with patch angioplasty. ANESTHESIA:  General endotracheal.    ESTIMATED BLOOD LOSS: 150 mL. DRAINS:  None. SPECIMENS:  None. COMPLICATIONS:  None. IMPLANTS:      INDICATIONS:  This is a 48-year-old gentleman with longstanding history of peripheral vascular disease, who previously had undergone right lower extremity bypass x2 initially with a vein graft and ultimately with a PTFE graft, both of which have failed. He continues to have severe debilitating and short distance claudication of the right leg. Angiographically, we were unable to recannulate the occluded PTFE graft. He was noted to have a proximal profunda femoral stenosis on this side as well. He was scheduled for attempted thrombectomy and possible redo bypass of the right leg as well as profundoplasty. DESCRIPTION OF THE PROCEDURE:  The patient was brought to the operating room and placed on the operating table in supine position. Following adequate general endotracheal anesthesia and a timeout procedure,  the right lower extremity was draped and prepped circumferentially. A vertical incision was made in the right groin through the existing scar. The wound was deepened using Bovie to control bleeding. Extensive adhesiolysis was performed and ultimately the PTFE graft, common femoral, profunda and SFA were all identified, mobilized and encircled with vessel loops.   Next, a vertical incision was made on the medial aspect of the right leg through existing scar. The wound was deepened using Bovie. The above-knee popliteal space was entered. The old PTFE graft was identified. The popliteal artery was mobilized and encircled the vessel proximal to the previous anastomosis and the anterior tibial and tibioperoneal trunks were also encircled with vessel loops. The patient was systemically heparinized. At this point, a longitudinal graftotomy was made just above the arterial anastomosis distally. A #4 Te catheter was then passed throughout the PTFE graft without difficulty. Organized thrombus was returned. There was noted to be dense hyperplastic occlusion of the distal anastomosis. This was left in place for the moment. Next, attention was turned to the proximal end of the PTFE graft. A longitudinal arteriotomy was performed just distal to the anastomosis. A #4 Te catheter was then passed throughout the length of the graft freely until there was no further return of thrombus. Again, there was noted to be dense hypoplastic occlusion of the proximal anastomosis. At this point, the common femoral and profunda SFA were occluded. The longitudinal graftotomy was then extended proximally. The hyperplastic tissue was then atherectomized, elevated and removed. There was an excellent luminal diameter present. A bovine pericardial patch was then sewn in position in this area as a patch angioplasty with a running 5-0 Prolene suture. Following completion of this, a clamp was placed on the graft just beyond the patch closure. The proximal clamp was then released and then it moved down to just above the profunda femoris artery. At this point, a longitudinal arteriotomy was performed on the profunda. The nearly occlusive atheroma was then endarterectomized. Once this was completed, a second patch angioplasty was performed with a segment of bovine pericardial patch with running 5-0 Prolene suture.   Prior to completion of the closure, the native vessel was flushed and backbled. Next, the closure was then completed and flow was restored. This one had excellent flow in the profunda distal to the patch closure. Next, attention was turned to the distal anastomosis. The longitudinal graftotomy was extended across the anastomosis and onto the below knee popliteal segment for about 1 cm. The artery below the anastomosis appeared to be normal.  The dense hyperplastic tissue was then endarterectomized and removed. At this point, there appeared to be an excellent flow channel. A third bovine pericardial patch was then sewn in position with a running 7-0 Prolene suture. Prior to completion of closure, the graft was flushed. The native vessels were backbled. At this point, the closure was completed and flow was restored. There was excellent flow at the level of the posterior tibial artery at the ankle at this point. At this point, protamine was administered to reverse heparin effect. The wounds were then irrigated and hemostasis was achieved. The wound was then closed in layers with Vicryl suture. Sterile dry dressings were applied. The patient was awakened from anesthesia and transferred to recovery room in stable condition. Patient tolerated the procedure well. No complications. All needle and sponge counts were correct.       MD Andres Langley / BANG  D: 05/30/2018 09:35     T: 05/30/2018 10:06  JOB #: [id number] Length To Time In Minutes Device Was In Place: 10

## 2021-07-23 ENCOUNTER — HOSPITAL ENCOUNTER (OUTPATIENT)
Dept: LAB | Age: 66
Discharge: HOME | End: 2021-07-23
Payer: MEDICARE

## 2021-07-23 DIAGNOSIS — R97.20 ELEVATED PSA: ICD-10-CM

## 2021-07-23 LAB — PSA SERPL-MCNC: 3 NG/ML

## 2021-07-23 PROCEDURE — 84153 ASSAY OF PSA TOTAL: CPT

## 2021-07-23 PROCEDURE — 36415 COLL VENOUS BLD VENIPUNCTURE: CPT

## 2021-10-12 ENCOUNTER — HOSPITAL ENCOUNTER (OUTPATIENT)
Dept: LAB | Age: 66
Discharge: HOME | End: 2021-10-12

## 2021-10-12 PROCEDURE — 88305 TISSUE EXAM BY PATHOLOGIST: CPT

## 2022-03-19 PROBLEM — I73.9 PAD (PERIPHERAL ARTERY DISEASE) (HCC): Status: ACTIVE | Noted: 2019-05-31

## 2022-03-19 PROBLEM — Z95.828 H/O EXTREMITY BYPASS GRAFT: Status: ACTIVE | Noted: 2019-03-14

## 2022-03-19 PROBLEM — I99.8 ISCHEMIA: Status: ACTIVE | Noted: 2018-05-29

## 2023-07-05 ENCOUNTER — TELEPHONE (OUTPATIENT)
Dept: VASCULAR SURGERY | Age: 68
End: 2023-07-05

## 2023-07-05 NOTE — TELEPHONE ENCOUNTER
Pt c/o L-foot numbness for over a month also L-hip starting w/numbness (has been working out-thought numbness was from that)  (Last seen 5/2019)    -appt made for tomorrow - ultrasound and see [employer] NP Monday

## 2023-07-10 ENCOUNTER — OFFICE VISIT (OUTPATIENT)
Dept: VASCULAR SURGERY | Age: 68
End: 2023-07-10
Payer: MEDICARE

## 2023-07-10 VITALS
OXYGEN SATURATION: 100 % | WEIGHT: 185.9 LBS | SYSTOLIC BLOOD PRESSURE: 145 MMHG | HEART RATE: 56 BPM | HEIGHT: 70 IN | BODY MASS INDEX: 26.61 KG/M2 | DIASTOLIC BLOOD PRESSURE: 85 MMHG

## 2023-07-10 DIAGNOSIS — I73.9 PAD (PERIPHERAL ARTERY DISEASE) (HCC): Primary | ICD-10-CM

## 2023-07-10 DIAGNOSIS — M54.42 CHRONIC LEFT-SIDED LOW BACK PAIN WITH LEFT-SIDED SCIATICA: ICD-10-CM

## 2023-07-10 DIAGNOSIS — G89.29 CHRONIC LEFT-SIDED LOW BACK PAIN WITH LEFT-SIDED SCIATICA: ICD-10-CM

## 2023-07-10 DIAGNOSIS — Z95.828 H/O EXTREMITY BYPASS GRAFT: ICD-10-CM

## 2023-07-10 PROCEDURE — 1123F ACP DISCUSS/DSCN MKR DOCD: CPT | Performed by: NURSE PRACTITIONER

## 2023-07-10 PROCEDURE — 99213 OFFICE O/P EST LOW 20 MIN: CPT | Performed by: NURSE PRACTITIONER

## 2023-07-10 RX ORDER — CHLORTHALIDONE 25 MG/1
TABLET ORAL
COMMUNITY
Start: 2023-04-19

## 2023-07-11 RX ORDER — LOSARTAN POTASSIUM 100 MG/1
TABLET ORAL
COMMUNITY
Start: 2023-04-05

## 2023-07-12 ENCOUNTER — OFFICE VISIT (OUTPATIENT)
Dept: ORTHOPEDIC SURGERY | Age: 68
End: 2023-07-12

## 2023-07-12 VITALS — WEIGHT: 148 LBS | BODY MASS INDEX: 21.19 KG/M2 | HEIGHT: 70 IN

## 2023-07-12 DIAGNOSIS — M51.36 DDD (DEGENERATIVE DISC DISEASE), LUMBAR: ICD-10-CM

## 2023-07-12 DIAGNOSIS — M21.371 FOOT DROP, BILATERAL: Primary | ICD-10-CM

## 2023-07-12 DIAGNOSIS — M21.372 FOOT DROP, BILATERAL: Primary | ICD-10-CM

## 2023-07-12 DIAGNOSIS — M43.16 SPONDYLOLISTHESIS OF LUMBAR REGION: ICD-10-CM

## 2023-07-12 DIAGNOSIS — M47.816 LUMBAR SPONDYLOSIS: ICD-10-CM

## 2023-07-12 RX ORDER — CHLORTHALIDONE 25 MG/1
25 TABLET ORAL DAILY
COMMUNITY
Start: 2023-05-03

## 2023-07-12 RX ORDER — LOSARTAN POTASSIUM 100 MG/1
100 TABLET ORAL DAILY
COMMUNITY
Start: 2023-04-05

## 2023-07-12 RX ORDER — SILDENAFIL 100 MG/1
100 TABLET, FILM COATED ORAL PRN
COMMUNITY
Start: 2022-11-01

## 2023-07-12 NOTE — PROGRESS NOTES
Name: Baljeet Alvares. YOB: 1955  Gender: male  MRN: [id number]    CC:   Chief Complaint   Patient presents with    New Patient     LOW BACK PAIN         HPI:   Jaymie Lema Sr. is a 75 y.o. male with a PMHx of peripheral artery disease status post revascularization of the left leg and stent placement in the right, CAD on Plavix. They present here for evaluation of back pain radiating to the left leg. Patient reports has had left artery disease for a number of years. For the last few months he has developed left-sided low back pain that radiates down the left thigh and lower leg to the foot with numbness. He also notes for the last 3 or 4 months he has had weakness in his legs feeling like he has trouble walking and poor balance. No trauma or injury at the onset of his symptoms. He was referred to us by his vascular service with Dr. Kesha Ham for assessment of his issues from a neurologic standpoint. His pain is up to an 8 out of 10 at times. It is affecting his ADLs and ability to mobilize and get around and care for his home. He has been advised to avoid NSAIDs given his cardiac history, he has used Tylenol in the past but generally avoids pain medication as a whole. Past Medical History Includes:   Past Medical History:   Diagnosis Date    Adverse effect of anesthesia     wife reports pt has stopped breathing in sleep -2yrs ago (2015)--- this hx in cc-- but pt unsure of meaning at [facility]  4/19/18    Asthma     dx in \"teens\" - no meds    Atheroscler of native artery of right leg with intermit claudication ([address])     Calculus of kidney     Diabetes ([address])     hx--- no meds --off x 1 yr    Former cigarette smoker     History of kidney stones 2006    X1, naturally pass    [place] (hard of hearing)     wears deondre hearing aids    Hypercholesterolemia     managed with meds    Hypertension     managed with meds    Iron deficiency anemia     Leg pain, bilateral     R leg     Long term

## 2023-07-24 ENCOUNTER — OFFICE VISIT (OUTPATIENT)
Dept: ORTHOPEDIC SURGERY | Age: 68
End: 2023-07-24
Payer: MEDICARE

## 2023-07-24 VITALS — WEIGHT: 148 LBS | BODY MASS INDEX: 21.19 KG/M2 | HEIGHT: 70 IN

## 2023-07-24 DIAGNOSIS — M48.061 LUMBAR FORAMINAL STENOSIS: ICD-10-CM

## 2023-07-24 DIAGNOSIS — M21.371 FOOT DROP, BILATERAL: Primary | ICD-10-CM

## 2023-07-24 DIAGNOSIS — M21.372 FOOT DROP, BILATERAL: Primary | ICD-10-CM

## 2023-07-24 PROCEDURE — 1123F ACP DISCUSS/DSCN MKR DOCD: CPT | Performed by: PHYSICIAN ASSISTANT

## 2023-07-24 PROCEDURE — 99214 OFFICE O/P EST MOD 30 MIN: CPT | Performed by: PHYSICIAN ASSISTANT

## 2023-07-24 NOTE — PROGRESS NOTES
07/24/23        Name: Earl Bales Sr. YOB: 1955  Gender: male  MRN: [id number]    CC: Follow-up (MRI results)       HPI: Earl Bales Sr. is a 75 y.o. male who returns for Follow-up (MRI results)         Patient returns the office today for follow-up after lumbar MRI. He reports his symptoms are grossly unchanged. He does confirm that he has had revascularization surgery for peripheral artery disease in both of his legs. And he confirms that he has had profound weakness in his feet and legs for 2 or 3 months now. History was obtained by patient      Meds/PSH/PMH/FH/SH: This information has been reviewed. ALLERGIES:   Allergies   Allergen Reactions    Other Anaphylaxis     Any fresh fruit from trees  Processed/cooked fruit is tolerable    Shellfish Allergy Anaphylaxis              Physical Examination:            Imaging:         MRI Result (most recent): MRI LUMBAR SPINE WO CONTRAST 07/19/2023    Narrative  History: Foot drop, right foot; Foot drop, left foot; Spondylolisthesis, lumbar  region; Spondylosis without myelopathy or radiculopathy, lumbar region; Other  intervertebral disc degeneration, lumbar region. Low back pain with left leg  numbness, several months duration    Exam: MRI lumbar spine without contrast    Technique: Axial and sagittal T1 and T2-weighted sequences are available for  review. A sagittal STIR sequence is also available for review. No comparison. Findings: There is grade 1 degenerative anterolisthesis present L5 on S1. Degenerative  disc signal present L2-3 through L5-S1 with varying degrees of disc height loss. The conus is normal in configuration and signal intensity throughout. Axial  imaging demonstrates no abnormal retroperitoneal lesions. L2-3: There is a disc bulge with bilateral facet arthropathy. No central or  neural foraminal narrowing.       L3-L4: There is a disc bulge with bilateral facet arthropathy and redundancy

## 2023-09-11 ENCOUNTER — OFFICE VISIT (OUTPATIENT)
Dept: ORTHOPEDIC SURGERY | Age: 68
End: 2023-09-11
Payer: MEDICARE

## 2023-09-11 DIAGNOSIS — M51.36 DDD (DEGENERATIVE DISC DISEASE), LUMBAR: ICD-10-CM

## 2023-09-11 DIAGNOSIS — M47.816 LUMBAR SPONDYLOSIS: ICD-10-CM

## 2023-09-11 DIAGNOSIS — M43.16 SPONDYLOLISTHESIS OF LUMBAR REGION: Primary | ICD-10-CM

## 2023-09-11 DIAGNOSIS — M48.062 SPINAL STENOSIS OF LUMBAR REGION WITH NEUROGENIC CLAUDICATION: ICD-10-CM

## 2023-09-11 PROCEDURE — 1123F ACP DISCUSS/DSCN MKR DOCD: CPT | Performed by: ORTHOPAEDIC SURGERY

## 2023-09-11 PROCEDURE — 99214 OFFICE O/P EST MOD 30 MIN: CPT | Performed by: ORTHOPAEDIC SURGERY

## 2023-09-11 NOTE — PROGRESS NOTES
Name: Baljeet Alvares. YOB: 1955  Gender: male  MRN: [id number]  Age: 75 y.o. Chief complaint: Back and buttock pain with activities. History of present illness: This is a very pleasant 75 y.o. old male who presents in referral from Samra Martinez with a longstanding history of low back pain with episodic radiation to the buttocks and lower extremities,  on the right  side. He has a history of vascular claudication. He has had several surgeries on the peripheral arteries in his lower extremities. He tells me that since being referred to me by Samra Martinez he started taking Superbeets supplement and it has dramatically changed his ability to stand and walk. 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 Whereas before he was unable to walk even 1 block. In the work-up with Samra Martinez he was found to have lumbar spondylolisthesis and right-sided L5-S1 foraminal stenosis. He has not had any treatment in that regard.             Medications:       Current Outpatient Medications:     chlorthalidone (HYGROTON) 25 MG tablet, Take 1 tablet by mouth daily, Disp: , Rfl:     sildenafil (VIAGRA) 100 MG tablet, Take 1 tablet by mouth as needed, Disp: , Rfl:     losartan (COZAAR) 100 MG tablet, Take 1 tablet by mouth daily, Disp: , Rfl:     losartan (COZAAR) 100 MG tablet, , Disp: , Rfl:     chlorthalidone (HYGROTON) 25 MG tablet, , Disp: , Rfl:     amLODIPine (NORVASC) 10 MG tablet, Take 1 tablet by mouth, Disp: , Rfl:     atenolol (TENORMIN) 50 MG tablet, Take 1 tablet by mouth, Disp: , Rfl:     atorvastatin (LIPITOR) 40 MG tablet, Take 1 tablet by mouth, Disp: , Rfl:     clopidogrel (PLAVIX) 75 MG tablet, Take 1 tablet by mouth, Disp: , Rfl:     ergocalciferol (ERGOCALCIFEROL) 1.25 MG (62607 UT) capsule, Take 1 capsule by mouth every 7 days, Disp: , Rfl:     ferrous sulfate

## 2023-12-10 ENCOUNTER — HOSPITAL ENCOUNTER (EMERGENCY)
Age: 68
Discharge: HOME | End: 2023-12-10
Attending: UROLOGY | Admitting: UROLOGY
Payer: MEDICARE

## 2023-12-10 ENCOUNTER — HOSPITAL ENCOUNTER (EMERGENCY)
Age: 68
Discharge: ANOTHER HOSPITAL | End: 2023-12-10
Attending: EMERGENCY MEDICINE
Payer: MEDICARE

## 2023-12-10 VITALS
BODY MASS INDEX: 20.33 KG/M2 | TEMPERATURE: 97.7 F | SYSTOLIC BLOOD PRESSURE: 184 MMHG | RESPIRATION RATE: 22 BRPM | WEIGHT: 142 LBS | HEIGHT: 70 IN | OXYGEN SATURATION: 100 % | HEART RATE: 101 BPM | DIASTOLIC BLOOD PRESSURE: 120 MMHG

## 2023-12-10 VITALS
HEART RATE: 89 BPM | SYSTOLIC BLOOD PRESSURE: 150 MMHG | DIASTOLIC BLOOD PRESSURE: 87 MMHG | RESPIRATION RATE: 15 BRPM | OXYGEN SATURATION: 99 % | TEMPERATURE: 97.7 F

## 2023-12-10 DIAGNOSIS — N35.811 OTHER URETHRAL STRICTURE, MALE, MEATAL: ICD-10-CM

## 2023-12-10 DIAGNOSIS — R33.9 URINARY RETENTION: Primary | ICD-10-CM

## 2023-12-10 PROCEDURE — 2500000003 HC RX 250 WO HCPCS: Performed by: PHYSICIAN ASSISTANT

## 2023-12-10 PROCEDURE — 51798 US URINE CAPACITY MEASURE: CPT

## 2023-12-10 PROCEDURE — 6370000000 HC RX 637 (ALT 250 FOR IP): Performed by: EMERGENCY MEDICINE

## 2023-12-10 PROCEDURE — 2500000003 HC RX 250 WO HCPCS: Performed by: UROLOGY

## 2023-12-10 PROCEDURE — 99285 EMERGENCY DEPT VISIT HI MDM: CPT

## 2023-12-10 RX ORDER — LIDOCAINE HYDROCHLORIDE 10 MG/ML
5 INJECTION, SOLUTION INFILTRATION; PERINEURAL ONCE
Status: COMPLETED | OUTPATIENT
Start: 2023-12-10 | End: 2023-12-10

## 2023-12-10 RX ORDER — TAMSULOSIN HYDROCHLORIDE 0.4 MG/1
0.4 CAPSULE ORAL
Status: COMPLETED | OUTPATIENT
Start: 2023-12-10 | End: 2023-12-10

## 2023-12-10 RX ORDER — CEPHALEXIN 500 MG/1
500 CAPSULE ORAL
Status: COMPLETED | OUTPATIENT
Start: 2023-12-10 | End: 2023-12-10

## 2023-12-10 RX ORDER — HYDROMORPHONE HYDROCHLORIDE 1 MG/ML
1 INJECTION, SOLUTION INTRAMUSCULAR; INTRAVENOUS; SUBCUTANEOUS
Status: COMPLETED | OUTPATIENT
Start: 2023-12-10 | End: 2023-12-10

## 2023-12-10 RX ORDER — LIDOCAINE HYDROCHLORIDE 20 MG/ML
JELLY TOPICAL
Status: COMPLETED | OUTPATIENT
Start: 2023-12-10 | End: 2023-12-10

## 2023-12-10 RX ADMIN — HYDROMORPHONE HYDROCHLORIDE 1 MG: 1 INJECTION, SOLUTION INTRAMUSCULAR; INTRAVENOUS; SUBCUTANEOUS at 16:15

## 2023-12-10 RX ADMIN — CEPHALEXIN 500 MG: 500 CAPSULE ORAL at 14:42

## 2023-12-10 RX ADMIN — LIDOCAINE HYDROCHLORIDE 5 ML: 10 INJECTION, SOLUTION INFILTRATION; PERINEURAL at 17:18

## 2023-12-10 RX ADMIN — LIDOCAINE HYDROCHLORIDE: 20 JELLY TOPICAL at 14:42

## 2023-12-10 RX ADMIN — TAMSULOSIN HYDROCHLORIDE 0.4 MG: 0.4 CAPSULE ORAL at 14:42

## 2023-12-10 ASSESSMENT — ENCOUNTER SYMPTOMS
NAUSEA: 0
VOMITING: 0
ABDOMINAL PAIN: 1

## 2023-12-10 ASSESSMENT — PAIN - FUNCTIONAL ASSESSMENT: PAIN_FUNCTIONAL_ASSESSMENT: 0-10

## 2023-12-10 ASSESSMENT — PAIN DESCRIPTION - LOCATION
LOCATION: PENIS
LOCATION: ABDOMEN

## 2023-12-10 ASSESSMENT — PAIN SCALES - GENERAL
PAINLEVEL_OUTOF10: 10

## 2023-12-10 ASSESSMENT — PAIN DESCRIPTION - DESCRIPTORS: DESCRIPTORS: BURNING

## 2023-12-10 ASSESSMENT — PAIN DESCRIPTION - ORIENTATION: ORIENTATION: LOWER

## 2023-12-10 NOTE — ED TRIAGE NOTES
Pt arrives to the ER with c/o of urinary retention since 0600 today. Pt states that they are experiencing 10/10 pain.

## 2023-12-10 NOTE — ED NOTES
TRANSFER - OUT REPORT:    Verbal report given to [address] on Brooks Nyhan Sr.  being transferred to [facility] ED for ordered procedure       Report consisted of patient's Situation, Background, Assessment and   Recommendations(SBAR). Information from the following report(s) Nurse Handoff Report, Adult Overview, and Quality Measures was reviewed with the receiving nurse. Lines:       Opportunity for questions and clarification was provided. Patient transported with:  POV with wife.        Abram Hooks RN  12/10/23 9422

## 2023-12-10 NOTE — PROGRESS NOTES
Procedure Note:    I was called to ED to see pt for AUR and meatal stenosis. After informed verbal consent obtained, his penis was prepped and draped in usual sterile fashion. I injected 7cc of 1% plain lidocaine in the base of the penis in a ring block fashion. I then dilated the meatal stenosis, first with an 8F urethral dilator up to 20F; I then inserted a 16F vance. We emptied 900cc of clear urine and relieved the pt's suprapubic pain. There were no complications.   His wife was present for the procedure.       -home with vance  -fu with  NP in 1-2 weeks for voiding trial and to teach SIC (should do that just through his meatus q day x 2 weeks after vance removed and then prn to help keep meatus from scaring back down  -PO abx (bactrim or similar) x 2 days

## 2023-12-10 NOTE — ED TRIAGE NOTES
Pt came into ED c/o urinary retention for a few days but worsening today, has not urinated aside from a few drops today.

## 2023-12-10 NOTE — ED NOTES
Per Dr. Tomeka Cade stated patient can go POV to ED [facility] for urology.       Steve Gonzalez, RN  12/10/23 1591

## 2023-12-10 NOTE — ED NOTES
TRANSFER - OUT REPORT:    Verbal report given to [facility] ED Charge RN on [address] [place].  being transferred to urology for routine progression of patient care       Report consisted of patient's Situation, Background, Assessment and   Recommendations(SBAR). Information from the following report(s) ED SBAR was reviewed with the receiving nurse. Kinder Fall Assessment:                           Lines:       Opportunity for questions and clarification was provided.       Patient transported via pov          Nj Bradley, [place]  12/10/23 4407

## 2023-12-10 NOTE — DISCHARGE INSTRUCTIONS
Go straight to the new ER at [address].  Did not stop at home or anywhere else, certainly do not eat

## 2023-12-10 NOTE — ED PROVIDER NOTES
Emergency Department Provider Note       PCP: Isabelle Barrios MD   Age: 70 y.o. Sex: male     DISPOSITION Decision To Transfer 12/10/2023 02:35:57 PM       ICD-10-CM    1. Urinary retention  R33.9       2. Other urethral stricture, male, meatal  N35.811           Medical Decision Making     Complexity of Problems Addressed:  1 or more acute illnesses that pose a threat to life or bodily function. Data Reviewed and Analyzed:  I independently ordered and reviewed each unique test.  I reviewed external records: provider visit note from outside specialist.   The patients assessment required an independent historian: Wife at bedside. The reason they were needed is important historical information not provided by the patient. Bladder scan reveals close to 600 cc of urine    Discussion of management or test interpretation. 70-year-old gentleman with urinary retention, suspect BPH, however he has a exceedingly tight meatal stricture and were unable to feed any catheter, even a 6 [place] into the urethra  Discussed with urology, he will be transferred to [facility] for Dr. Misale Jovel and his team to see    The patient was admitted and I have discussed patient management with the admitting provider. Risk of Complications and/or Morbidity of Patient Management:  Discussion with external consultants. Shared medical decision making was utilized in creating the patients health plan today. History       70-year-old gentleman presents to the ER with urinary retention, onset yesterday, he is the most been able to get a few drops out yesterday and few drops today. He states this has happened before and was seen in [facility] urology, he states they did not attempt a catheter but instead gave him some pills and told him just take these the urine will eventually come out.   Patient complains of suprapubic pain and fullness         Review of Systems   Constitutional:  Negative for

## 2023-12-10 NOTE — ED NOTES
CONSULT                      Date: 12/10/2023        Patient Name: Clay Ordonez. YOB: 1955      Age:  70 y.o. History of Present Illness     70 y.o.   male  presented to [place] ED with urinary retention since this morning. Transferred to [facility] ED for further mgmt. He reports suprapubic discomfort. He reports previous episodes of retention however it is not mentioned in prior notes. He states this has occurred following urethral instrumentation. Reported hx of kidney stones requiring ureteroscopy. Past Medical History     Past Medical History:   Diagnosis Date    Adverse effect of anesthesia     wife reports pt has stopped breathing in sleep -2yrs ago (2015)--- this hx in cc-- but pt unsure of meaning at [facility] 4/19/18    Asthma     dx in \"teens\" - no meds    Atheroscler of native artery of right leg with intermit claudication ([address])     Calculus of kidney     Diabetes ([address])     hx--- no meds --off x 1 yr    Former cigarette smoker     History of kidney stones 2006    X1, naturally pass    [place] (hard of hearing)     wears deondre hearing aids    Hypercholesterolemia     managed with meds    Hypertension     managed with meds    Iron deficiency anemia     Leg pain, bilateral     R leg     Long term (current) use of anticoagulants     Plavix and Pletal    Nicotine vapor product user     PAD (peripheral artery disease) ([address])     Stroke ([address]) 2006 & 2010    no residual-  x 2-    Thromboembolus (HCC)     right leg--- at present        Past Surgical History     Past Surgical History:   Procedure Laterality Date    COLONOSCOPY      VASCULAR SURGERY Bilateral 04/20/2018    arteriogram and aortogram    VASCULAR SURGERY  05/30/2018    Redo right lower extremity femoropopliteal bypass graft thrombectomy  with proximal and distal anastomotic patch angioplasty, right profundoplasty with patch angioplasty.     VASCULAR SURGERY Right 08/30/2017    aortogram w/RLE arteriogram    VASCULAR SURGERY Right 11/11/2016    femoral-below-knee pop. artery bypass w/6mmPTFE graft&distal vein cuff(lopez patch)    VASCULAR SURGERY Right 05/2013    stenting, then leg by-pass    VASCULAR SURGERY Left 2006    leg by-pass        Medications Prior to Admission     Prior to Admission medications    Medication Sig Start Date End Date Taking?  Authorizing Provider   chlorthalidone (HYGROTON) 25 MG tablet Take 1 tablet by mouth daily 5/3/23   Kim Foster MD   sildenafil (VIAGRA) 100 MG tablet Take 1 tablet by mouth as needed 11/1/22   ProviderKim MD   losartan (COZAAR) 100 MG tablet Take 1 tablet by mouth daily 4/5/23   Kim Foster MD   losartan (COZAAR) 100 MG tablet  4/5/23   Kim Foster MD   chlorthalidone (HYGROTON) 25 MG tablet  4/19/23   Kim Foster MD   amLODIPine (NORVASC) 10 MG tablet Take 1 tablet by mouth    Automatic Reconciliation, Ar   atenolol (TENORMIN) 50 MG tablet Take 1 tablet by mouth    Automatic Reconciliation, Ar   atorvastatin (LIPITOR) 40 MG tablet Take 1 tablet by mouth    Automatic Reconciliation, Ar   clopidogrel (PLAVIX) 75 MG tablet Take 1 tablet by mouth    Automatic Reconciliation, Ar   ergocalciferol (ERGOCALCIFEROL) 1.25 MG (28030 UT) capsule Take 1 capsule by mouth every 7 days    Automatic Reconciliation, Ar   ferrous sulfate (IRON 325) 325 (65 Fe) MG tablet Take 1 tablet by mouth every morning (before breakfast)    Automatic Reconciliation, Ar   lisinopril (PRINIVIL;ZESTRIL) 10 MG tablet Take 1 tablet by mouth    Automatic Reconciliation, Ar   sulfamethoxazole-trimethoprim (BACTRIM DS;SEPTRA DS) 800-160 MG per tablet Take 1 tablet by mouth 2 times daily 6/7/21   Automatic Reconciliation, Ar        Allergies     Other and Shellfish allergy    Social History     Social History       Tobacco History       Smoking Status  Former Quit Date  8/7/2016 Smoking Frequency  1.00 packs/day Smoking Tobacco Type  Cigarettes quit in 8/7/2016      Smokeless

## 2023-12-10 NOTE — ED NOTES
RN unable to advance catheter into penis after 2 attempts with 10 [place] vance     Imelda Morse, RN  12/10/23 3946

## 2023-12-13 ENCOUNTER — TELEPHONE (OUTPATIENT)
Dept: UROLOGY | Age: 68
End: 2023-12-13

## 2023-12-13 RX ORDER — SULFAMETHOXAZOLE AND TRIMETHOPRIM 800; 160 MG/1; MG/1
1 TABLET ORAL 2 TIMES DAILY
Qty: 6 TABLET | Refills: 0 | Status: SHIPPED | OUTPATIENT
Start: 2023-12-13 | End: 2023-12-16

## 2023-12-13 NOTE — TELEPHONE ENCOUNTER
He has not seen Dr. Danny Sibley since 2021. According to Dr. Burris Savanah 12/10/23 note, Bactrim for two days. Pt does not have abx, should he be on something?

## 2023-12-13 NOTE — TELEPHONE ENCOUNTER
0800 - assumed care of patient - alert and oriented - states he will be discharging home today and needs home health  - no d/c orders yet. 9619 - spa team at bedside - AM meds given. 1030 - spoke to daughter via phone - she agrees to a SNF prior to her father coming to live with her - discussed with patient and he is in agreement that SNF would be a good option - spoke with Franny London - discharge planner.     1200 - emily d/c'd.    1330 - daughter here and talking with case management re: disposition to [address] - meds given - vanc infusion started Pt aware.

## 2023-12-13 NOTE — TELEPHONE ENCOUNTER
Pt called complained of blood in urine and discomfort. Pt also asked was he supposed to get abx after visit.   Seen in chart:-PO abx (bactrim or similar) x 2 days   Please advise

## 2024-01-04 ENCOUNTER — OFFICE VISIT (OUTPATIENT)
Dept: UROLOGY | Age: 69
End: 2024-01-04
Payer: MEDICARE

## 2024-01-04 DIAGNOSIS — N35.911 STRICTURE OF URETHRAL MEATUS IN MALE, UNSPECIFIED STRICTURE TYPE: Primary | ICD-10-CM

## 2024-01-04 DIAGNOSIS — N20.0 KIDNEY STONE: ICD-10-CM

## 2024-01-04 DIAGNOSIS — N13.8 BPH WITH OBSTRUCTION/LOWER URINARY TRACT SYMPTOMS: ICD-10-CM

## 2024-01-04 DIAGNOSIS — R97.20 ELEVATED PSA: ICD-10-CM

## 2024-01-04 DIAGNOSIS — N40.1 BPH WITH OBSTRUCTION/LOWER URINARY TRACT SYMPTOMS: ICD-10-CM

## 2024-01-04 LAB
BILIRUBIN, URINE, POC: NEGATIVE
BLOOD URINE, POC: NEGATIVE
GLUCOSE URINE, POC: NEGATIVE
KETONES, URINE, POC: NEGATIVE
LEUKOCYTE ESTERASE, URINE, POC: NORMAL
NITRITE, URINE, POC: NEGATIVE
PH, URINE, POC: 6 (ref 4.6–8)
PROTEIN,URINE, POC: NEGATIVE
PVR, POC: 0 CC
SPECIFIC GRAVITY, URINE, POC: 1.02 (ref 1–1.03)
URINALYSIS CLARITY, POC: NORMAL
URINALYSIS COLOR, POC: NORMAL
UROBILINOGEN, POC: NORMAL

## 2024-01-04 PROCEDURE — 81003 URINALYSIS AUTO W/O SCOPE: CPT | Performed by: NURSE PRACTITIONER

## 2024-01-04 PROCEDURE — 1123F ACP DISCUSS/DSCN MKR DOCD: CPT | Performed by: NURSE PRACTITIONER

## 2024-01-04 PROCEDURE — 99214 OFFICE O/P EST MOD 30 MIN: CPT | Performed by: NURSE PRACTITIONER

## 2024-01-04 PROCEDURE — 51798 US URINE CAPACITY MEASURE: CPT | Performed by: NURSE PRACTITIONER

## 2024-01-04 ASSESSMENT — ENCOUNTER SYMPTOMS: BACK PAIN: 0

## 2024-01-04 NOTE — PROGRESS NOTES
Blood (UA POC) Negative     pH, Urine, POC 6 4.6 - 8.0    Protein, Urine, POC Negative     Urobilinogen, POC 0.2 mg/dL     Nitrite, Urine, POC Negative     Leukocyte Esterase, Urine, POC Trace        PHYSICAL EXAM    General appearance - well appearing and in no distress  Mental status - alert, oriented to person, place, and time  Neck - supple, no significant adenopathy  Chest/Lung-  Quiet, even and easy respiratory effort without use of accessory muscles  Skin - normal coloration and turgor, no rashes      Assessment and Plan    ICD-10-CM    1. Stricture of urethral meatus in male, unspecified stricture type  N35.911 AMB POC URINALYSIS DIP STICK AUTO W/O MICRO      2. BPH with obstruction/lower urinary tract symptoms  N40.1 AMB POC URINALYSIS DIP STICK AUTO W/O MICRO    N13.8 AMB POC PVR, JASPAL,POST-VOID RES,US,NON-IMAGING      3. Elevated PSA  R97.20 AMB POC PVR, JASPAL,POST-VOID RES,US,NON-IMAGING      4. Kidney stone  N20.0       He will finish out current box of catheters, then DC.     Cont flomax.     PSA deferred today as pt is still cath daily. Will repeat at fu.     Distant hx of kidney stones over 15 yrs ago. KUB at fu.     ROV in 6 months. To call sooner if needed.     HEIDY Buckley - CNP  Dr. Carey is supervising physician today and he approves plan of care.

## 2024-07-18 ENCOUNTER — OFFICE VISIT (OUTPATIENT)
Dept: UROLOGY | Age: 69
End: 2024-07-18
Payer: MEDICARE

## 2024-07-18 DIAGNOSIS — N13.8 BPH WITH OBSTRUCTION/LOWER URINARY TRACT SYMPTOMS: ICD-10-CM

## 2024-07-18 DIAGNOSIS — N40.1 BPH WITH OBSTRUCTION/LOWER URINARY TRACT SYMPTOMS: ICD-10-CM

## 2024-07-18 DIAGNOSIS — R97.20 ELEVATED PSA: ICD-10-CM

## 2024-07-18 DIAGNOSIS — Z80.42 FAMILY HX OF PROSTATE CANCER: ICD-10-CM

## 2024-07-18 DIAGNOSIS — N35.911 STRICTURE OF URETHRAL MEATUS IN MALE, UNSPECIFIED STRICTURE TYPE: Primary | ICD-10-CM

## 2024-07-18 LAB
BILIRUBIN, URINE, POC: NEGATIVE
BLOOD URINE, POC: NEGATIVE
GLUCOSE URINE, POC: NEGATIVE
KETONES, URINE, POC: NEGATIVE
LEUKOCYTE ESTERASE, URINE, POC: NEGATIVE
NITRITE, URINE, POC: NEGATIVE
PH, URINE, POC: 6 (ref 4.6–8)
PROTEIN,URINE, POC: NEGATIVE
PSA SERPL-MCNC: 5 NG/ML (ref 0–4)
PVR, POC: 0 CC
SPECIFIC GRAVITY, URINE, POC: 1.02 (ref 1–1.03)
URINALYSIS CLARITY, POC: NORMAL
URINALYSIS COLOR, POC: NORMAL
UROBILINOGEN, POC: NORMAL

## 2024-07-18 PROCEDURE — 1123F ACP DISCUSS/DSCN MKR DOCD: CPT | Performed by: NURSE PRACTITIONER

## 2024-07-18 PROCEDURE — 99214 OFFICE O/P EST MOD 30 MIN: CPT | Performed by: NURSE PRACTITIONER

## 2024-07-18 PROCEDURE — 51798 US URINE CAPACITY MEASURE: CPT | Performed by: NURSE PRACTITIONER

## 2024-07-18 PROCEDURE — 81003 URINALYSIS AUTO W/O SCOPE: CPT | Performed by: NURSE PRACTITIONER

## 2024-07-18 ASSESSMENT — ENCOUNTER SYMPTOMS: BACK PAIN: 0

## 2024-07-18 NOTE — PROGRESS NOTES
[facility]  [address]  [place] [address]  [phone]          Jason Kearney Sr.  : 1955    Chief Complaint   Patient presents with    6 Month Follow-Up    Benign Prostatic Hypertrophy          HPI     Jason Kearney Sr. is a 68 y.o. male followed for urethral stricture and elevated PSA.      To ER on 12/10/23 w inability to urinate. Difficult cath placement. Dr. Lee called. Dilated the meatal stenosis with an 8F urethral dilator up to 20F; 16F vance inserted. Emptied 900cc of clear urine and relieved the pt's suprapubic pain. DC home w catheter. Flomax started. He was taught to [facility] to keep stenosis open. He is no longer using cath. Voiding wo issue. PVR 0 cc via u/s. UA clear.      PSA 5.5 23.   PSA 3.0 21     Distant h/o kidney stones. No imaging of urinary tract on file.     Sildenafil 100 mg works well.    Plavix 75 mg after fem pop/stents.       Past Medical History:   Diagnosis Date    Adverse effect of anesthesia     wife reports pt has stopped breathing in sleep -2yrs ago ()--- this hx in cc-- but pt unsure of meaning at P.A.T. 18    Asthma     dx in \"teens\" - no meds    Atheroscler of native artery of right leg with intermit claudication (HCC)     Calculus of kidney     Diabetes (HCC)     hx--- no meds --off x 1 yr    Former cigarette smoker     History of kidney stones 2006    X1, naturally pass    [place] (hard of hearing)     wears deondre hearing aids    Hypercholesterolemia     managed with meds    Hypertension     managed with meds    Iron deficiency anemia     Leg pain, bilateral     R leg     Long term (current) use of anticoagulants     Plavix and Pletal    Nicotine vapor product user     PAD (peripheral artery disease) (HCC)     Stroke (HCC)  &     no residual-  x 2-    Thromboembolus (HCC)     right leg--- at present     Past Surgical History:   Procedure Laterality Date    COLONOSCOPY      VASCULAR SURGERY

## 2024-07-19 ENCOUNTER — TELEPHONE (OUTPATIENT)
Dept: UROLOGY | Age: 69
End: 2024-07-19

## 2024-07-19 NOTE — TELEPHONE ENCOUNTER
----- Message from HEIDY Buckley - CNP sent at 7/19/2024  7:55 AM EDT -----  PSA is elevated at 5. This is a little better than prev value, however due to family hx and cont elevation, I rec MRI prostate. This has been ordered. Will call results.

## 2024-07-29 ENCOUNTER — HOSPITAL ENCOUNTER (OUTPATIENT)
Dept: MRI IMAGING | Age: 69
Discharge: HOME | End: 2024-08-01
Payer: MEDICARE

## 2024-07-29 DIAGNOSIS — N40.1 BPH WITH OBSTRUCTION/LOWER URINARY TRACT SYMPTOMS: ICD-10-CM

## 2024-07-29 DIAGNOSIS — N13.8 BPH WITH OBSTRUCTION/LOWER URINARY TRACT SYMPTOMS: ICD-10-CM

## 2024-07-29 DIAGNOSIS — Z80.42 FAMILY HX OF PROSTATE CANCER: ICD-10-CM

## 2024-07-29 DIAGNOSIS — R97.20 ELEVATED PSA: ICD-10-CM

## 2024-07-29 PROCEDURE — A9579 GAD-BASE MR CONTRAST NOS,1ML: HCPCS | Performed by: NURSE PRACTITIONER

## 2024-07-29 PROCEDURE — 6360000004 HC RX CONTRAST MEDICATION: Performed by: NURSE PRACTITIONER

## 2024-07-29 PROCEDURE — 72197 MRI PELVIS W/O & W/DYE: CPT

## 2024-07-29 PROCEDURE — 2580000003 HC RX 258: Performed by: NURSE PRACTITIONER

## 2024-07-29 RX ORDER — SODIUM CHLORIDE 0.9 % (FLUSH) 0.9 %
30 SYRINGE (ML) INJECTION AS NEEDED
Status: DISCONTINUED | OUTPATIENT
Start: 2024-07-29 | End: 2024-08-02 | Stop reason: HOSPADM

## 2024-07-29 RX ADMIN — GADOTERIDOL 15 ML: 279.3 INJECTION, SOLUTION INTRAVENOUS at 19:31

## 2024-07-29 RX ADMIN — SODIUM CHLORIDE, PRESERVATIVE FREE 30 ML: 5 INJECTION INTRAVENOUS at 19:31

## 2024-08-08 ENCOUNTER — OFFICE VISIT (OUTPATIENT)
Dept: UROLOGY | Age: 69
End: 2024-08-08
Payer: MEDICARE

## 2024-08-08 DIAGNOSIS — N13.8 BPH WITH OBSTRUCTION/LOWER URINARY TRACT SYMPTOMS: Primary | ICD-10-CM

## 2024-08-08 DIAGNOSIS — N40.1 BPH WITH OBSTRUCTION/LOWER URINARY TRACT SYMPTOMS: Primary | ICD-10-CM

## 2024-08-08 DIAGNOSIS — R97.20 ELEVATED PSA: ICD-10-CM

## 2024-08-08 LAB
BILIRUBIN, URINE, POC: NEGATIVE
BLOOD URINE, POC: NEGATIVE
GLUCOSE URINE, POC: NEGATIVE
KETONES, URINE, POC: NEGATIVE
LEUKOCYTE ESTERASE, URINE, POC: NEGATIVE
NITRITE, URINE, POC: NEGATIVE
PH, URINE, POC: 6.5 (ref 4.6–8)
PROTEIN,URINE, POC: NEGATIVE
SPECIFIC GRAVITY, URINE, POC: 1.02 (ref 1–1.03)
URINALYSIS CLARITY, POC: NORMAL
URINALYSIS COLOR, POC: NORMAL
UROBILINOGEN, POC: NORMAL

## 2024-08-08 PROCEDURE — 1036F TOBACCO NON-USER: CPT | Performed by: UROLOGY

## 2024-08-08 PROCEDURE — G8427 DOCREV CUR MEDS BY ELIG CLIN: HCPCS | Performed by: UROLOGY

## 2024-08-08 PROCEDURE — 99214 OFFICE O/P EST MOD 30 MIN: CPT | Performed by: UROLOGY

## 2024-08-08 PROCEDURE — 3017F COLORECTAL CA SCREEN DOC REV: CPT | Performed by: UROLOGY

## 2024-08-08 PROCEDURE — 81003 URINALYSIS AUTO W/O SCOPE: CPT | Performed by: UROLOGY

## 2024-08-08 PROCEDURE — 1123F ACP DISCUSS/DSCN MKR DOCD: CPT | Performed by: UROLOGY

## 2024-08-08 PROCEDURE — G8420 CALC BMI NORM PARAMETERS: HCPCS | Performed by: UROLOGY

## 2024-08-08 RX ORDER — FINASTERIDE 5 MG/1
5 TABLET, FILM COATED ORAL DAILY
Qty: 90 TABLET | Refills: 3 | Status: SHIPPED | OUTPATIENT
Start: 2024-08-08

## 2024-08-08 ASSESSMENT — ENCOUNTER SYMPTOMS: BACK PAIN: 0

## 2024-08-08 NOTE — PROGRESS NOTES
[facility]  [address]  [place] [address]  [phone]          Jason Kearney Sr.  : 1955    Chief Complaint   Patient presents with    Results     MRI results          HPI     Jason Kearney Sr. is a 68 y.o. male followed for urethral stricture, bph, elevated PSA and + family history of prostate cancer.        To ER on 12/10/23 w inability to urinate. Difficult cath placement. Dr. Lee called. Dilated the meatal stenosis with an 8F urethral dilator up to 20F; 16F vance inserted. Emptied 900cc of clear urine and relieved the pt's suprapubic pain. DC home w catheter. Flomax started. He was taught to [facility] to keep stenosis open. He is no longer using cath. Voiding wo issue.     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  Gland was 73 grams.      PSA 5.5 23,  5.0  PSA 3.0 21     Distant h/o kidney stones. No imaging of urinary tract on file.      Sildenafil 100 mg works well.     Plavix 75 mg after fem pop/stents.           Past Medical History:   Diagnosis Date    Adverse effect of anesthesia     wife reports pt has stopped breathing in sleep -2yrs ago ()--- this hx in cc-- but pt unsure of meaning at P.A.T. 18    Asthma     dx in \"teens\" - no meds    Atheroscler of native artery of right leg with intermit claudication (HCC)     Calculus of kidney     Diabetes (HCC)     hx--- no meds --off x 1 yr    Former cigarette smoker     History of kidney stones 2006    X1, naturally pass    [place] (hard of hearing)     wears deondre hearing aids    Hypercholesterolemia     managed with meds    Hypertension     managed with meds    Iron deficiency anemia     Leg pain, bilateral     R leg     Long term (current) use of anticoagulants     Plavix and Pletal    Nicotine vapor product user     PAD (peripheral artery disease) (HCC)     Stroke (HCC)  &     no residual-  x 2-    Thromboembolus (HCC)     right leg--- at present

## (undated) DEVICE — (D)PREP SKN CHLRAPRP APPL 26ML -- CONVERT TO ITEM 371833

## (undated) DEVICE — GOWN,PREVENTION PLUS,2XL,ST,22/CS: Brand: MEDLINE

## (undated) DEVICE — DRAPE XR C ARM 41X74IN LF --

## (undated) DEVICE — INTENDED TO BE USED TO OCCLUDE, RETRACT AND IDENTIFY ARTERIES, VEINS, TENDONS AND NERVES IN SURGICAL PROCEDURES: Brand: STERION®  VESSEL LOOP

## (undated) DEVICE — 2000CC GUARDIAN II: Brand: GUARDIAN

## (undated) DEVICE — SUTURE MCRYL SZ 4-0 L27IN ABSRB UD L19MM PS-2 1/2 CIR PRIM Y426H

## (undated) DEVICE — Device

## (undated) DEVICE — GEL US 20GM NONIRRITATING OVERWRAPPED FILE PCH TRNSMIT

## (undated) DEVICE — FOGARTY ARTERIAL EMBOLECTOMY CATHETER 4F 80CM: Brand: FOGARTY

## (undated) DEVICE — SUTURE VCRL SZ 3-0 L27IN ABSRB UD L26MM SH 1/2 CIR J416H

## (undated) DEVICE — CATHETER VASC AD 5FR L65CM 0.038 DIAG KA 2 PERIPH W/O

## (undated) DEVICE — TRAY CATH 16F URIN MTR LTX -- CONVERT TO ITEM 363111

## (undated) DEVICE — SUTURE PROL SZ 5-0 L24IN NONABSORBABLE BLU L13MM C-1 3/8 M8725

## (undated) DEVICE — SYR LR LCK 1ML GRAD NSAF 30ML --

## (undated) DEVICE — SUTURE PERMAHAND SZ 3-0 L18IN NONABSORBABLE BLK SILK BRAID A184H

## (undated) DEVICE — BASIC SINGLE BASIN-LF: Brand: MEDLINE INDUSTRIES, INC.

## (undated) DEVICE — SYRINGE WITH HYPODERMIC SAFETY NEEDLE: Brand: MAGELLAN

## (undated) DEVICE — REM POLYHESIVE ADULT PATIENT RETURN ELECTRODE: Brand: VALLEYLAB

## (undated) DEVICE — 3M™ TEGADERM™ TRANSPARENT FILM DRESSING FRAME STYLE, 1626W, 4 IN X 4-3/4 IN (10 CM X 12 CM), 50/CT 4CT/CASE: Brand: 3M™ TEGADERM™

## (undated) DEVICE — SLIM BODY SKIN STAPLER: Brand: APPOSE ULC

## (undated) DEVICE — SUTURE PERMAHAND SZ 2-0 L12X18IN NONABSORBABLE BLK SILK A185H

## (undated) DEVICE — AMD ANTIMICROBIAL NON-ADHERENT PAD,0.2% POLYHEXAMETHYLENE BIGUANIDE HCI (PHMB): Brand: TELFA

## (undated) DEVICE — SUTURE VCRL SZ 2-0 L36IN ABSRB UD L36MM CT-1 1/2 CIR J945H

## (undated) DEVICE — BUTTON SWITCH PENCIL BLADE ELECTRODE, HOLSTER: Brand: EDGE

## (undated) DEVICE — DISH PETRI W/LID STRL -- MIN CASE ORDER IS 2 CA

## (undated) DEVICE — SUTURE PROL SZ 5-0 L24IN NONABSORBABLE BLU L9.3MM BV-1 3/8 9702H

## (undated) DEVICE — SOLUTION IV 1000ML 0.9% SOD CHL

## (undated) DEVICE — SUTURE VCRL SZ 4-0 L27IN ABSRB UD L19MM PS-2 3/8 CIR PRIM J426H

## (undated) DEVICE — BENTSON WIRE GUIDE 20CM DISTAL FLEXIBILITY WITH SOFTENED TIP: Brand: BENTSON

## (undated) DEVICE — SUTURE PROL SZ 6-0 L24IN NONABSORBABLE BLU L13MM C-1 3/8 8726H

## (undated) DEVICE — SUTURE NONABSORBABLE MONOFILAMENT 5-0 C-1 1X24 IN PROLENE 8725H

## (undated) DEVICE — APPLIER CLP L9.375IN APER 2.1MM CLS L3.8MM 20 SM TI CLP

## (undated) DEVICE — DRAPE TWL SURG 16X26IN BLU ORB04] ALLCARE INC]

## (undated) DEVICE — STERILE LATEX POWDER-FREE SURGICAL GLOVESWITH NITRILE COATING: Brand: PROTEXIS

## (undated) DEVICE — UNIVERSAL DRAPES: Brand: MEDLINE INDUSTRIES, INC.

## (undated) DEVICE — CARDINAL HEALTH FLEXIBLE LIGHT HANDLE COVER: Brand: CARDINAL HEALTH

## (undated) DEVICE — AGENT HEMSTAT W4XL4IN OXIDIZED REGENERATED CELOS ABSRB SFT

## (undated) DEVICE — SINGLE BASIN: Brand: CARDINAL HEALTH

## (undated) DEVICE — DISH PTRI STRL --

## (undated) DEVICE — 3M™ IOBAN™ 2 ANTIMICROBIAL INCISE DRAPE 6651EZ: Brand: IOBAN™ 2

## (undated) DEVICE — STOCKINETTE,DOUBLE PLY,6X48,STERILE: Brand: MEDLINE

## (undated) DEVICE — SUTURE VCRL SZ 3-0 L27IN ABSRB VLT L26MM SH 1/2 CIR J316H

## (undated) DEVICE — INTRODUCER SHTH 8FR CANN L5.5CM DIL TIP 35MM BLU TUNGSTEN